# Patient Record
Sex: MALE | Race: WHITE | NOT HISPANIC OR LATINO | Employment: FULL TIME | URBAN - METROPOLITAN AREA
[De-identification: names, ages, dates, MRNs, and addresses within clinical notes are randomized per-mention and may not be internally consistent; named-entity substitution may affect disease eponyms.]

---

## 2021-01-26 ENCOUNTER — OFFICE VISIT (OUTPATIENT)
Dept: INTERNAL MEDICINE | Facility: CLINIC | Age: 36
End: 2021-01-26
Payer: COMMERCIAL

## 2021-01-26 VITALS
SYSTOLIC BLOOD PRESSURE: 130 MMHG | HEART RATE: 89 BPM | DIASTOLIC BLOOD PRESSURE: 88 MMHG | BODY MASS INDEX: 25.45 KG/M2 | RESPIRATION RATE: 18 BRPM | TEMPERATURE: 98 F | OXYGEN SATURATION: 98 % | WEIGHT: 152.75 LBS | HEIGHT: 65 IN

## 2021-01-26 DIAGNOSIS — F41.9 ANXIETY: ICD-10-CM

## 2021-01-26 DIAGNOSIS — R11.10 POST-TUSSIVE EMESIS: ICD-10-CM

## 2021-01-26 DIAGNOSIS — Z76.89 ESTABLISHING CARE WITH NEW DOCTOR, ENCOUNTER FOR: ICD-10-CM

## 2021-01-26 DIAGNOSIS — J45.909 ASTHMA, UNSPECIFIED ASTHMA SEVERITY, UNSPECIFIED WHETHER COMPLICATED, UNSPECIFIED WHETHER PERSISTENT: ICD-10-CM

## 2021-01-26 DIAGNOSIS — Z00.00 PHYSICAL EXAM, ANNUAL: Primary | ICD-10-CM

## 2021-01-26 PROCEDURE — 99999 PR PBB SHADOW E&M-NEW PATIENT-LVL III: CPT | Mod: PBBFAC,,, | Performed by: STUDENT IN AN ORGANIZED HEALTH CARE EDUCATION/TRAINING PROGRAM

## 2021-01-26 PROCEDURE — 99385 PREV VISIT NEW AGE 18-39: CPT | Mod: S$GLB,,, | Performed by: STUDENT IN AN ORGANIZED HEALTH CARE EDUCATION/TRAINING PROGRAM

## 2021-01-26 PROCEDURE — 99999 PR PBB SHADOW E&M-NEW PATIENT-LVL III: ICD-10-PCS | Mod: PBBFAC,,, | Performed by: STUDENT IN AN ORGANIZED HEALTH CARE EDUCATION/TRAINING PROGRAM

## 2021-01-26 PROCEDURE — 99385 PR PREVENTIVE VISIT,NEW,18-39: ICD-10-PCS | Mod: S$GLB,,, | Performed by: STUDENT IN AN ORGANIZED HEALTH CARE EDUCATION/TRAINING PROGRAM

## 2021-01-26 PROCEDURE — 1126F AMNT PAIN NOTED NONE PRSNT: CPT | Mod: S$GLB,,, | Performed by: STUDENT IN AN ORGANIZED HEALTH CARE EDUCATION/TRAINING PROGRAM

## 2021-01-26 PROCEDURE — 3008F PR BODY MASS INDEX (BMI) DOCUMENTED: ICD-10-PCS | Mod: CPTII,S$GLB,, | Performed by: STUDENT IN AN ORGANIZED HEALTH CARE EDUCATION/TRAINING PROGRAM

## 2021-01-26 PROCEDURE — 3008F BODY MASS INDEX DOCD: CPT | Mod: CPTII,S$GLB,, | Performed by: STUDENT IN AN ORGANIZED HEALTH CARE EDUCATION/TRAINING PROGRAM

## 2021-01-26 PROCEDURE — 1126F PR PAIN SEVERITY QUANTIFIED, NO PAIN PRESENT: ICD-10-PCS | Mod: S$GLB,,, | Performed by: STUDENT IN AN ORGANIZED HEALTH CARE EDUCATION/TRAINING PROGRAM

## 2021-01-26 RX ORDER — FLUOXETINE HYDROCHLORIDE 20 MG/1
20 CAPSULE ORAL DAILY
Qty: 30 CAPSULE | Refills: 11 | Status: SHIPPED | OUTPATIENT
Start: 2021-01-26 | End: 2022-09-01

## 2021-01-26 RX ORDER — FLUTICASONE PROPIONATE 50 MCG
1 SPRAY, SUSPENSION (ML) NASAL DAILY
Qty: 16 G | Refills: 6 | Status: SHIPPED | OUTPATIENT
Start: 2021-01-26 | End: 2022-03-11 | Stop reason: SDUPTHER

## 2021-01-26 RX ORDER — FLUTICASONE PROPIONATE AND SALMETEROL 250; 50 UG/1; UG/1
POWDER RESPIRATORY (INHALATION)
COMMUNITY
Start: 2020-01-01 | End: 2021-01-26 | Stop reason: SDUPTHER

## 2021-01-26 RX ORDER — FLUTICASONE PROPIONATE AND SALMETEROL 250; 50 UG/1; UG/1
1 POWDER RESPIRATORY (INHALATION) 2 TIMES DAILY
Qty: 1 EACH | Refills: 5 | Status: SHIPPED | OUTPATIENT
Start: 2021-01-26 | End: 2024-02-29 | Stop reason: SDUPTHER

## 2021-01-26 RX ORDER — HYDROXYZINE HYDROCHLORIDE 25 MG/ML
INJECTION, SOLUTION INTRAMUSCULAR
COMMUNITY
Start: 2020-01-01 | End: 2022-09-01

## 2021-01-26 RX ORDER — PANTOPRAZOLE SODIUM 40 MG/1
40 TABLET, DELAYED RELEASE ORAL DAILY
Qty: 30 TABLET | Refills: 11 | Status: SHIPPED | OUTPATIENT
Start: 2021-01-26 | End: 2022-09-01

## 2021-01-26 RX ORDER — MONTELUKAST SODIUM 10 MG/1
10 TABLET ORAL NIGHTLY
Qty: 90 TABLET | Refills: 5 | Status: SHIPPED | OUTPATIENT
Start: 2021-01-26 | End: 2021-06-11 | Stop reason: SDUPTHER

## 2021-01-26 RX ORDER — MONTELUKAST SODIUM 10 MG/1
TABLET ORAL
COMMUNITY
Start: 2020-01-01 | End: 2021-01-26 | Stop reason: SDUPTHER

## 2021-01-26 RX ORDER — HYDROXYZINE PAMOATE 25 MG/1
25 CAPSULE ORAL EVERY 8 HOURS PRN
Qty: 90 CAPSULE | Refills: 4 | Status: SHIPPED | OUTPATIENT
Start: 2021-01-26 | End: 2021-10-14

## 2021-01-26 RX ORDER — ALBUTEROL SULFATE 90 UG/1
2 AEROSOL, METERED RESPIRATORY (INHALATION) EVERY 4 HOURS PRN
Qty: 18 G | Refills: 5 | Status: SHIPPED | OUTPATIENT
Start: 2021-01-26

## 2021-01-30 ENCOUNTER — LAB VISIT (OUTPATIENT)
Dept: LAB | Facility: HOSPITAL | Age: 36
End: 2021-01-30
Attending: STUDENT IN AN ORGANIZED HEALTH CARE EDUCATION/TRAINING PROGRAM
Payer: COMMERCIAL

## 2021-01-30 DIAGNOSIS — Z00.00 PHYSICAL EXAM, ANNUAL: ICD-10-CM

## 2021-01-30 LAB
ALBUMIN SERPL BCP-MCNC: 4.6 G/DL (ref 3.5–5.2)
ALP SERPL-CCNC: 50 U/L (ref 55–135)
ALT SERPL W/O P-5'-P-CCNC: 19 U/L (ref 10–44)
ANION GAP SERPL CALC-SCNC: 7 MMOL/L (ref 8–16)
AST SERPL-CCNC: 16 U/L (ref 10–40)
BASOPHILS # BLD AUTO: 0.04 K/UL (ref 0–0.2)
BASOPHILS NFR BLD: 1 % (ref 0–1.9)
BILIRUB SERPL-MCNC: 0.6 MG/DL (ref 0.1–1)
BUN SERPL-MCNC: 6 MG/DL (ref 6–20)
CALCIUM SERPL-MCNC: 10.2 MG/DL (ref 8.7–10.5)
CHLORIDE SERPL-SCNC: 104 MMOL/L (ref 95–110)
CHOLEST SERPL-MCNC: 198 MG/DL (ref 120–199)
CHOLEST/HDLC SERPL: 3.4 {RATIO} (ref 2–5)
CO2 SERPL-SCNC: 30 MMOL/L (ref 23–29)
CREAT SERPL-MCNC: 0.9 MG/DL (ref 0.5–1.4)
DIFFERENTIAL METHOD: NORMAL
EOSINOPHIL # BLD AUTO: 0.1 K/UL (ref 0–0.5)
EOSINOPHIL NFR BLD: 2.7 % (ref 0–8)
ERYTHROCYTE [DISTWIDTH] IN BLOOD BY AUTOMATED COUNT: 12.2 % (ref 11.5–14.5)
EST. GFR  (AFRICAN AMERICAN): >60 ML/MIN/1.73 M^2
EST. GFR  (NON AFRICAN AMERICAN): >60 ML/MIN/1.73 M^2
ESTIMATED AVG GLUCOSE: 97 MG/DL (ref 68–131)
GLUCOSE SERPL-MCNC: 85 MG/DL (ref 70–110)
HBA1C MFR BLD: 5 % (ref 4–5.6)
HCT VFR BLD AUTO: 47.2 % (ref 40–54)
HDLC SERPL-MCNC: 58 MG/DL (ref 40–75)
HDLC SERPL: 29.3 % (ref 20–50)
HGB BLD-MCNC: 15.1 G/DL (ref 14–18)
IMM GRANULOCYTES # BLD AUTO: 0.01 K/UL (ref 0–0.04)
IMM GRANULOCYTES NFR BLD AUTO: 0.2 % (ref 0–0.5)
LDLC SERPL CALC-MCNC: 122.6 MG/DL (ref 63–159)
LYMPHOCYTES # BLD AUTO: 1 K/UL (ref 1–4.8)
LYMPHOCYTES NFR BLD: 24.9 % (ref 18–48)
MCH RBC QN AUTO: 29.2 PG (ref 27–31)
MCHC RBC AUTO-ENTMCNC: 32 G/DL (ref 32–36)
MCV RBC AUTO: 91 FL (ref 82–98)
MONOCYTES # BLD AUTO: 0.4 K/UL (ref 0.3–1)
MONOCYTES NFR BLD: 9.4 % (ref 4–15)
NEUTROPHILS # BLD AUTO: 2.6 K/UL (ref 1.8–7.7)
NEUTROPHILS NFR BLD: 61.8 % (ref 38–73)
NONHDLC SERPL-MCNC: 140 MG/DL
NRBC BLD-RTO: 0 /100 WBC
PLATELET # BLD AUTO: 217 K/UL (ref 150–350)
PMV BLD AUTO: 11.2 FL (ref 9.2–12.9)
POTASSIUM SERPL-SCNC: 4.5 MMOL/L (ref 3.5–5.1)
PROT SERPL-MCNC: 7.6 G/DL (ref 6–8.4)
RBC # BLD AUTO: 5.17 M/UL (ref 4.6–6.2)
SODIUM SERPL-SCNC: 141 MMOL/L (ref 136–145)
TRIGL SERPL-MCNC: 87 MG/DL (ref 30–150)
TSH SERPL DL<=0.005 MIU/L-ACNC: 0.7 UIU/ML (ref 0.4–4)
WBC # BLD AUTO: 4.13 K/UL (ref 3.9–12.7)

## 2021-01-30 PROCEDURE — 80053 COMPREHEN METABOLIC PANEL: CPT

## 2021-01-30 PROCEDURE — 80074 ACUTE HEPATITIS PANEL: CPT

## 2021-01-30 PROCEDURE — 86703 HIV-1/HIV-2 1 RESULT ANTBDY: CPT

## 2021-01-30 PROCEDURE — 83036 HEMOGLOBIN GLYCOSYLATED A1C: CPT

## 2021-01-30 PROCEDURE — 80061 LIPID PANEL: CPT

## 2021-01-30 PROCEDURE — 84443 ASSAY THYROID STIM HORMONE: CPT

## 2021-01-30 PROCEDURE — 36415 COLL VENOUS BLD VENIPUNCTURE: CPT | Mod: PO

## 2021-01-30 PROCEDURE — 85025 COMPLETE CBC W/AUTO DIFF WBC: CPT

## 2021-02-01 LAB
HAV IGM SERPL QL IA: NEGATIVE
HBV CORE IGM SERPL QL IA: NEGATIVE
HBV SURFACE AG SERPL QL IA: NEGATIVE
HCV AB SERPL QL IA: NEGATIVE
HIV 1+2 AB+HIV1 P24 AG SERPL QL IA: NEGATIVE

## 2021-03-29 ENCOUNTER — IMMUNIZATION (OUTPATIENT)
Dept: PRIMARY CARE CLINIC | Facility: CLINIC | Age: 36
End: 2021-03-29
Payer: COMMERCIAL

## 2021-03-29 DIAGNOSIS — Z23 NEED FOR VACCINATION: Primary | ICD-10-CM

## 2021-03-29 PROCEDURE — 0011A PR IMMUNIZ ADMIN, SARS-COV-2 COVID-19 VACC, 100MCG/0.5ML, 1ST DOSE: ICD-10-PCS | Mod: CV19,S$GLB,, | Performed by: INTERNAL MEDICINE

## 2021-03-29 PROCEDURE — 0011A PR IMMUNIZ ADMIN, SARS-COV-2 COVID-19 VACC, 100MCG/0.5ML, 1ST DOSE: CPT | Mod: CV19,S$GLB,, | Performed by: INTERNAL MEDICINE

## 2021-03-29 PROCEDURE — 91301 PR SARS-COV-2 COVID-19 VACCINE, NO PRSV, 100MCG/0.5ML, IM: CPT | Mod: S$GLB,,, | Performed by: INTERNAL MEDICINE

## 2021-03-29 PROCEDURE — 91301 PR SARS-COV-2 COVID-19 VACCINE, NO PRSV, 100MCG/0.5ML, IM: ICD-10-PCS | Mod: S$GLB,,, | Performed by: INTERNAL MEDICINE

## 2021-03-29 RX ADMIN — Medication 0.5 ML: at 02:03

## 2021-04-28 ENCOUNTER — IMMUNIZATION (OUTPATIENT)
Dept: PRIMARY CARE CLINIC | Facility: CLINIC | Age: 36
End: 2021-04-28
Payer: COMMERCIAL

## 2021-04-28 DIAGNOSIS — Z23 NEED FOR VACCINATION: Primary | ICD-10-CM

## 2021-04-28 PROCEDURE — 0012A PR IMMUNIZ ADMIN, SARS-COV-2 COVID-19 VACC, 100MCG/0.5ML, 2ND DOSE: ICD-10-PCS | Mod: CV19,S$GLB,, | Performed by: INTERNAL MEDICINE

## 2021-04-28 PROCEDURE — 91301 PR SARS-COV-2 COVID-19 VACCINE, NO PRSV, 100MCG/0.5ML, IM: ICD-10-PCS | Mod: S$GLB,,, | Performed by: INTERNAL MEDICINE

## 2021-04-28 PROCEDURE — 91301 PR SARS-COV-2 COVID-19 VACCINE, NO PRSV, 100MCG/0.5ML, IM: CPT | Mod: S$GLB,,, | Performed by: INTERNAL MEDICINE

## 2021-04-28 PROCEDURE — 0012A PR IMMUNIZ ADMIN, SARS-COV-2 COVID-19 VACC, 100MCG/0.5ML, 2ND DOSE: CPT | Mod: CV19,S$GLB,, | Performed by: INTERNAL MEDICINE

## 2021-04-28 RX ADMIN — Medication 0.5 ML: at 03:04

## 2021-05-13 ENCOUNTER — PATIENT MESSAGE (OUTPATIENT)
Dept: INTERNAL MEDICINE | Facility: CLINIC | Age: 36
End: 2021-05-13

## 2021-05-25 ENCOUNTER — PATIENT MESSAGE (OUTPATIENT)
Dept: INTERNAL MEDICINE | Facility: CLINIC | Age: 36
End: 2021-05-25

## 2021-06-11 ENCOUNTER — OFFICE VISIT (OUTPATIENT)
Dept: INTERNAL MEDICINE | Facility: CLINIC | Age: 36
End: 2021-06-11
Payer: COMMERCIAL

## 2021-06-11 VITALS
TEMPERATURE: 97 F | HEIGHT: 65 IN | BODY MASS INDEX: 27.1 KG/M2 | OXYGEN SATURATION: 99 % | WEIGHT: 162.69 LBS | HEART RATE: 79 BPM | SYSTOLIC BLOOD PRESSURE: 106 MMHG | DIASTOLIC BLOOD PRESSURE: 82 MMHG | RESPIRATION RATE: 18 BRPM

## 2021-06-11 DIAGNOSIS — F41.9 ANXIETY: ICD-10-CM

## 2021-06-11 DIAGNOSIS — G47.00 INSOMNIA, UNSPECIFIED TYPE: ICD-10-CM

## 2021-06-11 DIAGNOSIS — F32.A DEPRESSION, UNSPECIFIED DEPRESSION TYPE: ICD-10-CM

## 2021-06-11 DIAGNOSIS — R41.840 CONCENTRATION DEFICIT: Primary | ICD-10-CM

## 2021-06-11 PROCEDURE — 99214 OFFICE O/P EST MOD 30 MIN: CPT | Mod: S$GLB,,, | Performed by: STUDENT IN AN ORGANIZED HEALTH CARE EDUCATION/TRAINING PROGRAM

## 2021-06-11 PROCEDURE — 99214 PR OFFICE/OUTPT VISIT, EST, LEVL IV, 30-39 MIN: ICD-10-PCS | Mod: S$GLB,,, | Performed by: STUDENT IN AN ORGANIZED HEALTH CARE EDUCATION/TRAINING PROGRAM

## 2021-06-11 PROCEDURE — 3008F PR BODY MASS INDEX (BMI) DOCUMENTED: ICD-10-PCS | Mod: CPTII,S$GLB,, | Performed by: STUDENT IN AN ORGANIZED HEALTH CARE EDUCATION/TRAINING PROGRAM

## 2021-06-11 PROCEDURE — 1126F PR PAIN SEVERITY QUANTIFIED, NO PAIN PRESENT: ICD-10-PCS | Mod: S$GLB,,, | Performed by: STUDENT IN AN ORGANIZED HEALTH CARE EDUCATION/TRAINING PROGRAM

## 2021-06-11 PROCEDURE — 3008F BODY MASS INDEX DOCD: CPT | Mod: CPTII,S$GLB,, | Performed by: STUDENT IN AN ORGANIZED HEALTH CARE EDUCATION/TRAINING PROGRAM

## 2021-06-11 PROCEDURE — 1126F AMNT PAIN NOTED NONE PRSNT: CPT | Mod: S$GLB,,, | Performed by: STUDENT IN AN ORGANIZED HEALTH CARE EDUCATION/TRAINING PROGRAM

## 2021-06-11 PROCEDURE — 99999 PR PBB SHADOW E&M-EST. PATIENT-LVL IV: CPT | Mod: PBBFAC,,, | Performed by: STUDENT IN AN ORGANIZED HEALTH CARE EDUCATION/TRAINING PROGRAM

## 2021-06-11 PROCEDURE — 99999 PR PBB SHADOW E&M-EST. PATIENT-LVL IV: ICD-10-PCS | Mod: PBBFAC,,, | Performed by: STUDENT IN AN ORGANIZED HEALTH CARE EDUCATION/TRAINING PROGRAM

## 2021-06-11 RX ORDER — MONTELUKAST SODIUM 10 MG/1
10 TABLET ORAL NIGHTLY
Qty: 90 TABLET | Refills: 5 | Status: SHIPPED | OUTPATIENT
Start: 2021-06-11 | End: 2022-07-07

## 2021-06-11 RX ORDER — BUPROPION HYDROCHLORIDE 150 MG/1
150 TABLET, EXTENDED RELEASE ORAL 2 TIMES DAILY
Qty: 60 TABLET | Refills: 11 | Status: SHIPPED | OUTPATIENT
Start: 2021-06-11 | End: 2022-01-07 | Stop reason: DRUGHIGH

## 2021-11-11 ENCOUNTER — IMMUNIZATION (OUTPATIENT)
Dept: INTERNAL MEDICINE | Facility: CLINIC | Age: 36
End: 2021-11-11
Payer: COMMERCIAL

## 2021-11-11 DIAGNOSIS — Z23 NEED FOR VACCINATION: Primary | ICD-10-CM

## 2021-11-11 PROCEDURE — 0064A COVID-19, MRNA, LNP-S, PF, 100 MCG/0.25 ML DOSE VACCINE (MODERNA BOOSTER): CPT | Mod: PBBFAC | Performed by: INTERNAL MEDICINE

## 2022-01-07 ENCOUNTER — OFFICE VISIT (OUTPATIENT)
Dept: INTERNAL MEDICINE | Facility: CLINIC | Age: 37
End: 2022-01-07
Payer: COMMERCIAL

## 2022-01-07 DIAGNOSIS — F41.1 GENERALIZED ANXIETY DISORDER WITH PANIC ATTACKS: ICD-10-CM

## 2022-01-07 DIAGNOSIS — F33.1 MODERATE EPISODE OF RECURRENT MAJOR DEPRESSIVE DISORDER: Primary | ICD-10-CM

## 2022-01-07 DIAGNOSIS — F41.0 GENERALIZED ANXIETY DISORDER WITH PANIC ATTACKS: ICD-10-CM

## 2022-01-07 PROCEDURE — 99214 OFFICE O/P EST MOD 30 MIN: CPT | Mod: 95,,, | Performed by: STUDENT IN AN ORGANIZED HEALTH CARE EDUCATION/TRAINING PROGRAM

## 2022-01-07 PROCEDURE — 99214 PR OFFICE/OUTPT VISIT, EST, LEVL IV, 30-39 MIN: ICD-10-PCS | Mod: 95,,, | Performed by: STUDENT IN AN ORGANIZED HEALTH CARE EDUCATION/TRAINING PROGRAM

## 2022-01-07 RX ORDER — BUPROPION HYDROCHLORIDE 150 MG/1
150 TABLET ORAL DAILY
Qty: 30 TABLET | Refills: 11 | Status: SHIPPED | OUTPATIENT
Start: 2022-01-07 | End: 2023-01-24 | Stop reason: SDUPTHER

## 2022-01-07 RX ORDER — ALPRAZOLAM 0.25 MG/1
0.25 TABLET ORAL 2 TIMES DAILY PRN
Qty: 90 TABLET | Refills: 2 | Status: SHIPPED | OUTPATIENT
Start: 2022-01-07 | End: 2022-09-01

## 2022-03-02 RX ORDER — HYDROXYZINE PAMOATE 25 MG/1
25 CAPSULE ORAL EVERY 8 HOURS PRN
Qty: 90 CAPSULE | Refills: 4 | Status: SHIPPED | OUTPATIENT
Start: 2022-03-02 | End: 2022-09-01

## 2022-03-11 RX ORDER — FLUTICASONE PROPIONATE 50 MCG
1 SPRAY, SUSPENSION (ML) NASAL DAILY
Qty: 16 G | Refills: 6 | Status: SHIPPED | OUTPATIENT
Start: 2022-03-11 | End: 2022-04-04

## 2022-03-11 NOTE — TELEPHONE ENCOUNTER
No new care gaps identified.  Powered by [employer]. Reference number: [id number].   3/11/2022 1:57:41 PM CST

## 2022-04-04 RX ORDER — HYDROXYZINE PAMOATE 25 MG/1
25 CAPSULE ORAL EVERY 8 HOURS PRN
Qty: 270 CAPSULE | Refills: 2 | OUTPATIENT
Start: 2022-04-04

## 2022-04-04 NOTE — TELEPHONE ENCOUNTER
Pt requested Rx for Vistaril, there are refills available at his pharmacy.  Pt was sent a [employer] message to contact his pharmacy for his Rx request

## 2022-07-07 RX ORDER — MONTELUKAST SODIUM 10 MG/1
TABLET ORAL
Qty: 90 TABLET | Refills: 1 | Status: SHIPPED | OUTPATIENT
Start: 2022-07-07

## 2022-07-07 NOTE — TELEPHONE ENCOUNTER
No new care gaps identified.  [facility] Embedded Care Gaps. Reference number: [id number]. 7/07/2022   4:07:09 AM REBECCAT

## 2022-07-07 NOTE — TELEPHONE ENCOUNTER
Refill Authorization Note   Amol Oliveiraparth  is requesting a refill authorization.  Brief Assessment and Rationale for Refill:  Approve     Medication Therapy Plan:       Medication Reconciliation Completed: No   Comments:     No Care Gaps Recommended  Note composed:10:33 AM 07/07/2022

## 2022-07-11 ENCOUNTER — LAB VISIT (OUTPATIENT)
Dept: LAB | Facility: HOSPITAL | Age: 37
End: 2022-07-11
Attending: STUDENT IN AN ORGANIZED HEALTH CARE EDUCATION/TRAINING PROGRAM
Payer: COMMERCIAL

## 2022-07-11 ENCOUNTER — OFFICE VISIT (OUTPATIENT)
Dept: INTERNAL MEDICINE | Facility: CLINIC | Age: 37
End: 2022-07-11
Payer: COMMERCIAL

## 2022-07-11 VITALS
RESPIRATION RATE: 15 BRPM | SYSTOLIC BLOOD PRESSURE: 128 MMHG | DIASTOLIC BLOOD PRESSURE: 80 MMHG | WEIGHT: 187.38 LBS | BODY MASS INDEX: 31.22 KG/M2 | OXYGEN SATURATION: 98 % | HEIGHT: 65 IN | TEMPERATURE: 97 F | HEART RATE: 84 BPM

## 2022-07-11 DIAGNOSIS — F41.1 GENERALIZED ANXIETY DISORDER WITH PANIC ATTACKS: ICD-10-CM

## 2022-07-11 DIAGNOSIS — F41.0 GENERALIZED ANXIETY DISORDER WITH PANIC ATTACKS: ICD-10-CM

## 2022-07-11 DIAGNOSIS — Z00.00 PHYSICAL EXAM, ANNUAL: Primary | ICD-10-CM

## 2022-07-11 DIAGNOSIS — Z00.00 PHYSICAL EXAM, ANNUAL: ICD-10-CM

## 2022-07-11 DIAGNOSIS — Z30.09 VASECTOMY EVALUATION: ICD-10-CM

## 2022-07-11 PROCEDURE — 1159F MED LIST DOCD IN RCRD: CPT | Mod: CPTII,S$GLB,, | Performed by: STUDENT IN AN ORGANIZED HEALTH CARE EDUCATION/TRAINING PROGRAM

## 2022-07-11 PROCEDURE — 84443 ASSAY THYROID STIM HORMONE: CPT | Performed by: STUDENT IN AN ORGANIZED HEALTH CARE EDUCATION/TRAINING PROGRAM

## 2022-07-11 PROCEDURE — 3079F DIAST BP 80-89 MM HG: CPT | Mod: CPTII,S$GLB,, | Performed by: STUDENT IN AN ORGANIZED HEALTH CARE EDUCATION/TRAINING PROGRAM

## 2022-07-11 PROCEDURE — 3008F PR BODY MASS INDEX (BMI) DOCUMENTED: ICD-10-PCS | Mod: CPTII,S$GLB,, | Performed by: STUDENT IN AN ORGANIZED HEALTH CARE EDUCATION/TRAINING PROGRAM

## 2022-07-11 PROCEDURE — 85025 COMPLETE CBC W/AUTO DIFF WBC: CPT | Performed by: STUDENT IN AN ORGANIZED HEALTH CARE EDUCATION/TRAINING PROGRAM

## 2022-07-11 PROCEDURE — 36415 COLL VENOUS BLD VENIPUNCTURE: CPT | Mod: PO | Performed by: STUDENT IN AN ORGANIZED HEALTH CARE EDUCATION/TRAINING PROGRAM

## 2022-07-11 PROCEDURE — 99999 PR PBB SHADOW E&M-EST. PATIENT-LVL V: CPT | Mod: PBBFAC,,, | Performed by: STUDENT IN AN ORGANIZED HEALTH CARE EDUCATION/TRAINING PROGRAM

## 2022-07-11 PROCEDURE — 82306 VITAMIN D 25 HYDROXY: CPT | Performed by: STUDENT IN AN ORGANIZED HEALTH CARE EDUCATION/TRAINING PROGRAM

## 2022-07-11 PROCEDURE — 3074F SYST BP LT 130 MM HG: CPT | Mod: CPTII,S$GLB,, | Performed by: STUDENT IN AN ORGANIZED HEALTH CARE EDUCATION/TRAINING PROGRAM

## 2022-07-11 PROCEDURE — 3074F PR MOST RECENT SYSTOLIC BLOOD PRESSURE < 130 MM HG: ICD-10-PCS | Mod: CPTII,S$GLB,, | Performed by: STUDENT IN AN ORGANIZED HEALTH CARE EDUCATION/TRAINING PROGRAM

## 2022-07-11 PROCEDURE — 99395 PREV VISIT EST AGE 18-39: CPT | Mod: S$GLB,,, | Performed by: STUDENT IN AN ORGANIZED HEALTH CARE EDUCATION/TRAINING PROGRAM

## 2022-07-11 PROCEDURE — 1159F PR MEDICATION LIST DOCUMENTED IN MEDICAL RECORD: ICD-10-PCS | Mod: CPTII,S$GLB,, | Performed by: STUDENT IN AN ORGANIZED HEALTH CARE EDUCATION/TRAINING PROGRAM

## 2022-07-11 PROCEDURE — 84436 ASSAY OF TOTAL THYROXINE: CPT | Performed by: STUDENT IN AN ORGANIZED HEALTH CARE EDUCATION/TRAINING PROGRAM

## 2022-07-11 PROCEDURE — 99999 PR PBB SHADOW E&M-EST. PATIENT-LVL V: ICD-10-PCS | Mod: PBBFAC,,, | Performed by: STUDENT IN AN ORGANIZED HEALTH CARE EDUCATION/TRAINING PROGRAM

## 2022-07-11 PROCEDURE — 99395 PR PREVENTIVE VISIT,EST,18-39: ICD-10-PCS | Mod: S$GLB,,, | Performed by: STUDENT IN AN ORGANIZED HEALTH CARE EDUCATION/TRAINING PROGRAM

## 2022-07-11 PROCEDURE — 3008F BODY MASS INDEX DOCD: CPT | Mod: CPTII,S$GLB,, | Performed by: STUDENT IN AN ORGANIZED HEALTH CARE EDUCATION/TRAINING PROGRAM

## 2022-07-11 PROCEDURE — 3079F PR MOST RECENT DIASTOLIC BLOOD PRESSURE 80-89 MM HG: ICD-10-PCS | Mod: CPTII,S$GLB,, | Performed by: STUDENT IN AN ORGANIZED HEALTH CARE EDUCATION/TRAINING PROGRAM

## 2022-07-11 PROCEDURE — 80061 LIPID PANEL: CPT | Performed by: STUDENT IN AN ORGANIZED HEALTH CARE EDUCATION/TRAINING PROGRAM

## 2022-07-11 PROCEDURE — 80053 COMPREHEN METABOLIC PANEL: CPT | Performed by: STUDENT IN AN ORGANIZED HEALTH CARE EDUCATION/TRAINING PROGRAM

## 2022-07-11 NOTE — PROGRESS NOTES
Subjective:      Chief Complaint: Annual Exam    HPI   Mr. Amol North is a 37-year-old man presenting for annual physical and ongoing evaluation/treatment for combination depression anxiety:    Depression/anxiety:  -Wellbutrin uptitrated last appointment with concomitant prescribing of p.r.n. low-dose abortive Xanax (to account for any interval worsening in panic attack severity/frequency; does not appear to have ever been filled)  - doing very well on combination Wellbutrin and cognitive behavioral therapy without need for p.r.n. Xanax use.    Vasectomy referral:  - the patient and his wife have extensively discussed neither is interested in having children  - he understands that vasectomies, although reversible, are considered definitive stabilization    Family, social, surgical Hx reviewed     Health Maintenance:  Due for annual screening labs    Past Medical History:   Diagnosis Date    Anxiety     Asthma     Depression     IBS (irritable bowel syndrome)      No past surgical history on file.  Family History   Problem Relation Age of Onset    Anxiety disorder Mother     Anxiety disorder Father      Social History     Socioeconomic History    Marital status: Significant Other   Occupational History    Occupation: PHD student    Tobacco Use    Smoking status: Never Smoker    Smokeless tobacco: Never Used   Substance and Sexual Activity    Alcohol use: Yes     Comment: occ;     Drug use: Yes     Types: Marijuana     Comment: none currently     Sexual activity: Yes     Partners: Female     Review of patient's allergies indicates:   Allergen Reactions    Biaxin [clarithromycin] Palpitations     Amol North had no medications administered during this visit.      Review of Systems   Constitutional: Negative for activity change and unexpected weight change.   HENT: Negative for hearing loss, rhinorrhea and trouble swallowing.    Eyes: Negative for discharge and visual disturbance.    Respiratory: Negative for chest tightness and wheezing.    Cardiovascular: Negative for chest pain and palpitations.   Gastrointestinal: Negative for blood in stool, constipation, diarrhea and vomiting.   Endocrine: Negative for polydipsia and polyuria.   Genitourinary: Negative for difficulty urinating, hematuria and urgency.   Musculoskeletal: Negative for arthralgias, joint swelling and neck pain.   Neurological: Negative for weakness and headaches.   Psychiatric/Behavioral: Negative for confusion and dysphoric mood.         Objective:      Vitals:    07/11/22 1508   BP: 128/80   Pulse: 84   Resp: 15   Temp: 97.3 °F (36.3 °C)      Physical Exam  Vitals reviewed.   Constitutional:       General: He is not in acute distress.     Appearance: Normal appearance.   HENT:      Head: Normocephalic and atraumatic.      Comments: Facial features are symmetric      Nose: Nose normal. No congestion or rhinorrhea.      Mouth/Throat:      Mouth: Mucous membranes are moist.      Pharynx: Oropharynx is clear. No oropharyngeal exudate or posterior oropharyngeal erythema.   Eyes:      General: No scleral icterus.     Extraocular Movements: Extraocular movements intact.      Conjunctiva/sclera: Conjunctivae normal.   Cardiovascular:      Rate and Rhythm: Normal rate and regular rhythm.      Pulses: Normal pulses.      Heart sounds: Normal heart sounds.   Pulmonary:      Effort: Pulmonary effort is normal. No respiratory distress.      Breath sounds: Normal breath sounds.   Musculoskeletal:         General: No deformity or signs of injury. Normal range of motion.      Cervical back: Normal range of motion.      Comments: Gait normal    Skin:     General: Skin is warm and dry.      Findings: No rash.   Neurological:      General: No focal deficit present.      Mental Status: He is alert and oriented to person, place, and time. Mental status is at baseline.   Psychiatric:         Mood and Affect: Mood normal.         Behavior:  Behavior normal.         Thought Content: Thought content normal.       Current Outpatient Medications on File Prior to Visit   Medication Sig Dispense Refill    albuterol (VENTOLIN HFA) 90 mcg/actuation inhaler Inhale 2 puffs into the lungs every 4 (four) hours as needed for Wheezing. Rescue 18 g 5    ALBUTEROL, BULK, MISC       buPROPion (WELLBUTRIN XL) 150 MG TB24 tablet Take 1 tablet (150 mg total) by mouth once daily. 30 tablet 11    fluticasone propionate (FLONASE) 50 mcg/actuation nasal spray USE 1 SPRAY (50 MCG TOTAL) IN EACH NOSTRIL ONCE DAILY 48 mL 3    fluticasone-salmeterol diskus inhaler 250-50 mcg Inhale 1 puff into the lungs 2 (two) times a day. 1 each 5    montelukast (SINGULAIR) 10 mg tablet TAKE 1 TABLET BY MOUTH EVERY DAY IN THE EVENING 90 tablet 1    ALPRAZolam (XANAX) 0.25 MG tablet Take 1 tablet (0.25 mg total) by mouth 2 (two) times daily as needed for Insomnia or Anxiety. 90 tablet 2    FLUoxetine 20 MG capsule Take 1 capsule (20 mg total) by mouth once daily. (Patient not taking: Reported on 6/11/2021) 30 capsule 11    hydrOXYzine (VISTARIL) 25 mg/mL injection       hydrOXYzine pamoate (VISTARIL) 25 MG Cap TAKE 1 CAPSULE (25 MG TOTAL) BY MOUTH EVERY 8 (EIGHT) HOURS AS NEEDED. 90 capsule 4    pantoprazole (PROTONIX) 40 MG tablet Take 1 tablet (40 mg total) by mouth once daily. (Patient not taking: Reported on 6/11/2021) 30 tablet 11     No current facility-administered medications on file prior to visit.         Assessment:       1. Physical exam, annual    2. Vasectomy evaluation    3. Generalized anxiety disorder with panic attacks        Plan:       Physical exam, annual  -     CBC Auto Differential; Future; Expected date: 07/11/2022  -     Comprehensive Metabolic Panel; Future; Expected date: 07/11/2022  -     Lipid Panel; Future; Expected date: 07/11/2022  -     TSH; Future; Expected date: 07/11/2022  -     T4; Future; Expected date: 07/11/2022  -     Vitamin D; Future;  Expected date: 07/11/2022   - annual screening labs ordered    Vasectomy evaluation  -     Ambulatory referral/consult to Urology; Future; Expected date: 07/18/2022    Generalized anxiety disorder with panic attacks   - doing very well; continue current regimen and return to clinic p.r.n.

## 2022-07-12 LAB
25(OH)D3+25(OH)D2 SERPL-MCNC: 32 NG/ML (ref 30–96)
ALBUMIN SERPL BCP-MCNC: 4.6 G/DL (ref 3.5–5.2)
ALP SERPL-CCNC: 60 U/L (ref 55–135)
ALT SERPL W/O P-5'-P-CCNC: 50 U/L (ref 10–44)
ANION GAP SERPL CALC-SCNC: 10 MMOL/L (ref 8–16)
AST SERPL-CCNC: 32 U/L (ref 10–40)
BASOPHILS # BLD AUTO: 0.03 K/UL (ref 0–0.2)
BASOPHILS NFR BLD: 0.6 % (ref 0–1.9)
BILIRUB SERPL-MCNC: 0.8 MG/DL (ref 0.1–1)
BUN SERPL-MCNC: 6 MG/DL (ref 6–20)
CALCIUM SERPL-MCNC: 10.5 MG/DL (ref 8.7–10.5)
CHLORIDE SERPL-SCNC: 103 MMOL/L (ref 95–110)
CHOLEST SERPL-MCNC: 254 MG/DL (ref 120–199)
CHOLEST/HDLC SERPL: 3.8 {RATIO} (ref 2–5)
CO2 SERPL-SCNC: 25 MMOL/L (ref 23–29)
CREAT SERPL-MCNC: 0.9 MG/DL (ref 0.5–1.4)
DIFFERENTIAL METHOD: NORMAL
EOSINOPHIL # BLD AUTO: 0.2 K/UL (ref 0–0.5)
EOSINOPHIL NFR BLD: 3.9 % (ref 0–8)
ERYTHROCYTE [DISTWIDTH] IN BLOOD BY AUTOMATED COUNT: 12.1 % (ref 11.5–14.5)
EST. GFR  (AFRICAN AMERICAN): >60 ML/MIN/1.73 M^2
EST. GFR  (NON AFRICAN AMERICAN): >60 ML/MIN/1.73 M^2
GLUCOSE SERPL-MCNC: 88 MG/DL (ref 70–110)
HCT VFR BLD AUTO: 46.8 % (ref 40–54)
HDLC SERPL-MCNC: 66 MG/DL (ref 40–75)
HDLC SERPL: 26 % (ref 20–50)
HGB BLD-MCNC: 15.6 G/DL (ref 14–18)
IMM GRANULOCYTES # BLD AUTO: 0.01 K/UL (ref 0–0.04)
IMM GRANULOCYTES NFR BLD AUTO: 0.2 % (ref 0–0.5)
LDLC SERPL CALC-MCNC: 162.4 MG/DL (ref 63–159)
LYMPHOCYTES # BLD AUTO: 1.3 K/UL (ref 1–4.8)
LYMPHOCYTES NFR BLD: 25.6 % (ref 18–48)
MCH RBC QN AUTO: 29.8 PG (ref 27–31)
MCHC RBC AUTO-ENTMCNC: 33.3 G/DL (ref 32–36)
MCV RBC AUTO: 90 FL (ref 82–98)
MONOCYTES # BLD AUTO: 0.5 K/UL (ref 0.3–1)
MONOCYTES NFR BLD: 9.6 % (ref 4–15)
NEUTROPHILS # BLD AUTO: 3 K/UL (ref 1.8–7.7)
NEUTROPHILS NFR BLD: 60.1 % (ref 38–73)
NONHDLC SERPL-MCNC: 188 MG/DL
NRBC BLD-RTO: 0 /100 WBC
PLATELET # BLD AUTO: 226 K/UL (ref 150–450)
PMV BLD AUTO: 11.4 FL (ref 9.2–12.9)
POTASSIUM SERPL-SCNC: 4.7 MMOL/L (ref 3.5–5.1)
PROT SERPL-MCNC: 7.9 G/DL (ref 6–8.4)
RBC # BLD AUTO: 5.23 M/UL (ref 4.6–6.2)
SODIUM SERPL-SCNC: 138 MMOL/L (ref 136–145)
T4 SERPL-MCNC: 7 UG/DL (ref 4.5–11.5)
TRIGL SERPL-MCNC: 128 MG/DL (ref 30–150)
TSH SERPL DL<=0.005 MIU/L-ACNC: 0.72 UIU/ML (ref 0.4–4)
WBC # BLD AUTO: 4.92 K/UL (ref 3.9–12.7)

## 2022-08-05 ENCOUNTER — OFFICE VISIT (OUTPATIENT)
Dept: UROLOGY | Facility: CLINIC | Age: 37
End: 2022-08-05
Payer: COMMERCIAL

## 2022-08-05 VITALS
BODY MASS INDEX: 31.44 KG/M2 | DIASTOLIC BLOOD PRESSURE: 83 MMHG | WEIGHT: 188.69 LBS | HEIGHT: 65 IN | SYSTOLIC BLOOD PRESSURE: 119 MMHG | HEART RATE: 70 BPM

## 2022-08-05 DIAGNOSIS — Z30.09 VASECTOMY EVALUATION: ICD-10-CM

## 2022-08-05 PROCEDURE — 99999 PR PBB SHADOW E&M-EST. PATIENT-LVL IV: CPT | Mod: PBBFAC,,, | Performed by: UROLOGY

## 2022-08-05 PROCEDURE — 99203 OFFICE O/P NEW LOW 30 MIN: CPT | Mod: S$GLB,,, | Performed by: UROLOGY

## 2022-08-05 PROCEDURE — 99203 PR OFFICE/OUTPT VISIT, NEW, LEVL III, 30-44 MIN: ICD-10-PCS | Mod: S$GLB,,, | Performed by: UROLOGY

## 2022-08-05 PROCEDURE — 1159F PR MEDICATION LIST DOCUMENTED IN MEDICAL RECORD: ICD-10-PCS | Mod: CPTII,S$GLB,, | Performed by: UROLOGY

## 2022-08-05 PROCEDURE — 3074F PR MOST RECENT SYSTOLIC BLOOD PRESSURE < 130 MM HG: ICD-10-PCS | Mod: CPTII,S$GLB,, | Performed by: UROLOGY

## 2022-08-05 PROCEDURE — 99999 PR PBB SHADOW E&M-EST. PATIENT-LVL IV: ICD-10-PCS | Mod: PBBFAC,,, | Performed by: UROLOGY

## 2022-08-05 PROCEDURE — 1159F MED LIST DOCD IN RCRD: CPT | Mod: CPTII,S$GLB,, | Performed by: UROLOGY

## 2022-08-05 PROCEDURE — 3008F PR BODY MASS INDEX (BMI) DOCUMENTED: ICD-10-PCS | Mod: CPTII,S$GLB,, | Performed by: UROLOGY

## 2022-08-05 PROCEDURE — 3074F SYST BP LT 130 MM HG: CPT | Mod: CPTII,S$GLB,, | Performed by: UROLOGY

## 2022-08-05 PROCEDURE — 3079F DIAST BP 80-89 MM HG: CPT | Mod: CPTII,S$GLB,, | Performed by: UROLOGY

## 2022-08-05 PROCEDURE — 3008F BODY MASS INDEX DOCD: CPT | Mod: CPTII,S$GLB,, | Performed by: UROLOGY

## 2022-08-05 PROCEDURE — 3079F PR MOST RECENT DIASTOLIC BLOOD PRESSURE 80-89 MM HG: ICD-10-PCS | Mod: CPTII,S$GLB,, | Performed by: UROLOGY

## 2022-08-05 RX ORDER — DIAZEPAM 5 MG/1
5 TABLET ORAL
Qty: 3 TABLET | Refills: 0 | Status: SHIPPED | OUTPATIENT
Start: 2022-08-05 | End: 2024-02-28

## 2022-08-05 RX ORDER — HYDROCODONE BITARTRATE AND ACETAMINOPHEN 5; 325 MG/1; MG/1
1 TABLET ORAL EVERY 6 HOURS PRN
Qty: 10 TABLET | Refills: 0 | Status: SHIPPED | OUTPATIENT
Start: 2022-08-05 | End: 2024-02-28

## 2022-08-05 RX ORDER — DOXYCYCLINE 100 MG/1
100 CAPSULE ORAL 2 TIMES DAILY
Qty: 6 CAPSULE | Refills: 0 | Status: SHIPPED | OUTPATIENT
Start: 2022-08-05 | End: 2022-08-08

## 2022-08-05 RX ORDER — LIDOCAINE HYDROCHLORIDE 20 MG/ML
10 INJECTION, SOLUTION INFILTRATION; PERINEURAL ONCE
Status: CANCELLED | OUTPATIENT
Start: 2022-08-05 | End: 2022-08-05

## 2022-08-05 NOTE — PATIENT INSTRUCTIONS
The risks and benefits of vasectomy were discussed with the patient in detail.    The risks include bleeding, infection, pain, scarring, chronic pain, sperm granuloma, recannulization and loss of testicular function.    The patient was informed that the long term effects of vasectomy are unknown and could be associated with a higher risk of prostate cancer.    He will make sure he is off all blood thinners 1 week prior.    Consent was obtained.  The patient will be scheduled for vasectomy.     A prescription was written for Valium and Doxycline pre-op and Hydrocodone post-vasectomy.  Detailed instruction on how to take valium and Doxycycline was given.     He will shave the scrotum as instructed and take an antibiotic shower prior to his vasectomy.  Also recommend that he should bring a jock strap with him for scrotal support after vasectomy.

## 2022-08-05 NOTE — PROGRESS NOTES
CC: desired vasectomy    Amol North is a 37 y.o. man who is here for the evaluation of vasectomy  A new pt referred by his PCP, Kamini Rizvi MD   He is  but has no child.  He and his wife are interested in vasectomy for sterilization.  His wife has been using IUD so far.  He works at [employer].  No family hx of prostate cancer.    Past Medical History:   Diagnosis Date    Anxiety     Asthma     Depression     IBS (irritable bowel syndrome)      History reviewed. No pertinent surgical history.  Social History     Tobacco Use    Smoking status: Never Smoker    Smokeless tobacco: Never Used   Substance Use Topics    Alcohol use: Yes     Comment: occ;     Drug use: Yes     Types: Marijuana     Comment: none currently      Family History   Problem Relation Age of Onset    Anxiety disorder Mother     Anxiety disorder Father      Allergy:  Review of patient's allergies indicates:   Allergen Reactions    Biaxin [clarithromycin] Palpitations     Outpatient Encounter Medications as of 8/5/2022   Medication Sig Dispense Refill    albuterol (VENTOLIN HFA) 90 mcg/actuation inhaler Inhale 2 puffs into the lungs every 4 (four) hours as needed for Wheezing. Rescue 18 g 5    ALBUTEROL, BULK, MISC       buPROPion (WELLBUTRIN XL) 150 MG TB24 tablet Take 1 tablet (150 mg total) by mouth once daily. 30 tablet 11    fluticasone propionate (FLONASE) 50 mcg/actuation nasal spray USE 1 SPRAY (50 MCG TOTAL) IN EACH NOSTRIL ONCE DAILY 48 mL 3    fluticasone-salmeterol diskus inhaler 250-50 mcg Inhale 1 puff into the lungs 2 (two) times a day. 1 each 5    montelukast (SINGULAIR) 10 mg tablet TAKE 1 TABLET BY MOUTH EVERY DAY IN THE EVENING 90 tablet 1    ALPRAZolam (XANAX) 0.25 MG tablet Take 1 tablet (0.25 mg total) by mouth 2 (two) times daily as needed for Insomnia or Anxiety. 90 tablet 2    diazePAM (VALIUM) 5 MG tablet Take 1 tablet (5 mg total) by mouth as needed for Anxiety.  Take all 3 pills 1 hour before the procedure 3 tablet 0    doxycycline (VIBRAMYCIN) 100 MG Cap Take 1 capsule (100 mg total) by mouth 2 (two) times daily. Take the first tablet 1 hour before the procedure, then twice a day. for 6 doses 6 capsule 0    FLUoxetine 20 MG capsule Take 1 capsule (20 mg total) by mouth once daily. (Patient not taking: Reported on 6/11/2021) 30 capsule 11    HYDROcodone-acetaminophen (NORCO) 5-325 mg per tablet Take 1 tablet by mouth every 6 (six) hours as needed for Pain. 10 tablet 0    hydrOXYzine (VISTARIL) 25 mg/mL injection       hydrOXYzine pamoate (VISTARIL) 25 MG Cap TAKE 1 CAPSULE (25 MG TOTAL) BY MOUTH EVERY 8 (EIGHT) HOURS AS NEEDED. 90 capsule 4    pantoprazole (PROTONIX) 40 MG tablet Take 1 tablet (40 mg total) by mouth once daily. (Patient not taking: Reported on 6/11/2021) 30 tablet 11     No facility-administered encounter medications on file as of 8/5/2022.     Review of Systems   ROS  Physical Exam     Vitals:    08/05/22 0934   BP: 119/83   Pulse: 70     Physical Exam  Genitalia:  Scrotum: no rash or lesion  Normal symmetric epididymis without masses  Normal vas palpated  Normal size, symmetric testicles with no masses   Normal urethral meatus with no discharge  Normal circumcised penis with no lesion         LABS:  No results found for: PSA, PSADIAG, PSATOTAL, PSAFREE, PSAFREEPCT  No results found for this or any previous visit.  Lab Results   Component Value Date    CREATININE 0.9 07/11/2022    CREATININE 0.9 01/30/2021     No results found for this or any previous visit.  No results found for: LABURIN  Hemoglobin A1C   Date Value Ref Range Status   01/30/2021 5.0 4.0 - 5.6 % Final     Comment:     ADA Screening Guidelines:  5.7-6.4%  Consistent with prediabetes  >or=6.5%  Consistent with diabetes    High levels of fetal hemoglobin interfere with the HbA1C  assay. Heterozygous hemoglobin variants (HbS, HgC, etc)do  not significantly interfere with this assay.    However, presence of multiple variants may affect accuracy.         Radiology:    Assessment and Plan:  Diagnoses and all orders for this visit:    Vasectomy evaluation  -     Ambulatory referral/consult to Urology  -     Vasectomy; Future  -     diazePAM (VALIUM) 5 MG tablet; Take 1 tablet (5 mg total) by mouth as needed for Anxiety. Take all 3 pills 1 hour before the procedure  -     doxycycline (VIBRAMYCIN) 100 MG Cap; Take 1 capsule (100 mg total) by mouth 2 (two) times daily. Take the first tablet 1 hour before the procedure, then twice a day. for 6 doses  -     HYDROcodone-acetaminophen (NORCO) 5-325 mg per tablet; Take 1 tablet by mouth every 6 (six) hours as needed for Pain.    Other orders  -     LIDOcaine HCL 20 mg/ml (2%) injection 10 mL    The risks and benefits of vasectomy were discussed with the patient in detail.    The risks include bleeding, infection, pain, scarring, chronic pain, sperm granuloma, recannulization and loss of testicular function.    The patient was informed that the long term effects of vasectomy are unknown and could be associated with a higher risk of prostate cancer.    He will make sure he is off all blood thinners 1 week prior.    Consent was obtained.  The patient will be scheduled for vasectomy.     A prescription was written for Valium and Doxycline pre-op and Hydrocodone post-vasectomy.  Detailed instruction on how to take valium and Doxycycline was given.     He will shave the scrotum as instructed and take an antibiotic shower prior to his vasectomy.  Also recommend that he should bring a jock strap with him for scrotal support after vasectomy.    Follow-up:  Follow up vasectomy.

## 2022-09-01 ENCOUNTER — PROCEDURE VISIT (OUTPATIENT)
Dept: UROLOGY | Facility: CLINIC | Age: 37
End: 2022-09-01
Payer: COMMERCIAL

## 2022-09-01 VITALS
HEART RATE: 84 BPM | WEIGHT: 187.38 LBS | HEIGHT: 65 IN | DIASTOLIC BLOOD PRESSURE: 78 MMHG | BODY MASS INDEX: 31.22 KG/M2 | RESPIRATION RATE: 17 BRPM | TEMPERATURE: 98 F | SYSTOLIC BLOOD PRESSURE: 121 MMHG

## 2022-09-01 DIAGNOSIS — Z30.09 VASECTOMY EVALUATION: ICD-10-CM

## 2022-09-01 PROCEDURE — 55250 REMOVAL OF SPERM DUCT(S): CPT | Mod: S$GLB,,, | Performed by: UROLOGY

## 2022-09-01 PROCEDURE — 55250 VASECTOMY: ICD-10-PCS | Mod: S$GLB,,, | Performed by: UROLOGY

## 2022-09-01 RX ORDER — LIDOCAINE HYDROCHLORIDE 20 MG/ML
10 INJECTION, SOLUTION INFILTRATION; PERINEURAL ONCE
Status: COMPLETED | OUTPATIENT
Start: 2022-09-01 | End: 2022-09-01

## 2022-09-01 RX ADMIN — LIDOCAINE HYDROCHLORIDE 10 ML: 20 INJECTION, SOLUTION INFILTRATION; PERINEURAL at 11:09

## 2022-09-01 NOTE — PATIENT INSTRUCTIONS
What to Expect After a Vasectomy  You cannot drive or operate heavy machinery on the day of the procedure.    Apply ice packs to the scrotal area for 24-48 hours. Avoid direct contact of the ice pack with the skin. Scrotal supports, jock straps, or fitted underwear help elevate the scrotum and reduce discomfort.    You may shower the next day. Gently apply soapy water to the scrotum to wash. Rinse and dry yourself by blotting the skin, not rubbing.    Avoid strenuous physical exercises or sexual relations for at least one week after a vasectomy.    Continue to use birth control for at least 6 weeks or 10-20 ejaculations. You are still considered fertile until your urologist examines a post-vasectomy semen analysis at 6 weeks and perhaps one at 8 weeks as well. Drop off the specimen at the 4th floor [facility] between 8am and 4pm.    Do NOT resume unprotected sexual activity until your physician finds no sperm in your semen.    All stitches will dissolve on their own in 1-2 weeks.    Signs and Symptoms to Report  A large amount of bleeding at the site  An unusual amount of pain  A large amount of swelling in the scrotum  Fever and chills  Any signs of infection, such as redness at the site or foul-smelling drainage    Risks  The risks of complication after vasectomy are very low. A few of the risks include:  Bleeding  Infection  Scrotal hematoma - a collection of blood in the scrotum  Inflammation of the epididymis - inflammation of a structure next to the testicle that helps in maturation of sperm  Sperm granuloma - a collection of sperm that leaks out from the vas deferens, forming a small nodule or lump. This does not usually cause any discomfort but you may feel it in the scrotum.  Recanalization - the restoration of the lumen or transport tube between the two ends of the vas deferens, possibly causing fertility    If you have any questions or concerns, please call your [facility] urologist at  [phone].

## 2022-09-01 NOTE — PROCEDURES
Vasectomy    Date/Time: 9/1/2022 10:15 AM  Performed by: Conor Tatum MD  Authorized by: Conor Tatum MD        Procedure Date:  09/01/2022    Procedure:                                                                        Bilateral Vasectomy                                                                                              Pre-OP Diagnosis:                                                                 Patient requests sterilization                                          Post-OP Diagnosis:                                                                Patient requests sterilization                                          Anesthesia:                                                                       Anesthesia Administered:                                                     Local anesthetic infiltration, using 6 mL of 2% lidocaine                    (Xylocaine) without epinephrine injected into the skin /                     subcutaneous tissues (administered by surgeon).                         Findings:                                                                         Vas identified bilaterally.                                   Description of Procedure:                                                         Informed Consent:                                                            - Risks, benefits and alternatives of procedure discussed with               patient and informed consent obtained.                                       Patient Position:                                                            - Supine.                                                                    Prep and Drape:                                                              - Patient prepped and draped in usual sterile fashion using povidone         iodine (Betadine).                                                           Incision(s):                                                                  - bilateral scrotal incision ( 1 cm or less).                                                         Instruments:                                                                 - Ring clamp and sharp mosquito.                                             Procedure Details:                                                           - Vas Ends Occluded with: Clips.                                             - Methods of Vas Occlusion: Clips.                                           - Length of Vas Excised: 1.5 cm.                                             Closure:                                                                     - Subcutaneous Tissue: 3-0 Chromic.                                          - Skin: 3-0 Chromic interrupted.                                             Wound Dressing:                                                              - Wound Dressed with: 4 x 4 gauze and scrotal supporter.                     Sponge / Instrument / Needle Counts:                                         - Final counts correct.                                                      Estimated Blood Loss:                                                        - Minimal.                                                                   Pathology Specimen:                                                          - none                                                  Complications:                                                                    No immediate complications.                                             Post-OP Plan:                                                                     Patient was discharged home in a stable condition.         Medications: abx, pain medication given before.         Follow up: [facility] in 1 month.                                                               Post Vasectomy instructions given.

## 2022-12-23 ENCOUNTER — PATIENT MESSAGE (OUTPATIENT)
Dept: INTERNAL MEDICINE | Facility: CLINIC | Age: 37
End: 2022-12-23
Payer: COMMERCIAL

## 2024-02-26 ENCOUNTER — PATIENT MESSAGE (OUTPATIENT)
Dept: INTERNAL MEDICINE | Facility: CLINIC | Age: 39
End: 2024-02-26
Payer: COMMERCIAL

## 2024-02-28 ENCOUNTER — OFFICE VISIT (OUTPATIENT)
Dept: URGENT CARE | Facility: CLINIC | Age: 39
End: 2024-02-28
Payer: COMMERCIAL

## 2024-02-28 VITALS
OXYGEN SATURATION: 98 % | TEMPERATURE: 98 F | HEART RATE: 112 BPM | RESPIRATION RATE: 22 BRPM | HEIGHT: 65 IN | WEIGHT: 187 LBS | DIASTOLIC BLOOD PRESSURE: 78 MMHG | BODY MASS INDEX: 31.16 KG/M2 | SYSTOLIC BLOOD PRESSURE: 133 MMHG

## 2024-02-28 DIAGNOSIS — R09.89 CHEST CONGESTION: ICD-10-CM

## 2024-02-28 DIAGNOSIS — R05.1 ACUTE COUGH: ICD-10-CM

## 2024-02-28 DIAGNOSIS — B96.89 BACTERIAL URI: Primary | ICD-10-CM

## 2024-02-28 DIAGNOSIS — J06.9 BACTERIAL URI: Primary | ICD-10-CM

## 2024-02-28 DIAGNOSIS — R00.0 TACHYCARDIA: ICD-10-CM

## 2024-02-28 PROCEDURE — 99214 OFFICE O/P EST MOD 30 MIN: CPT | Mod: S$GLB,,, | Performed by: NURSE PRACTITIONER

## 2024-02-28 PROCEDURE — 71046 X-RAY EXAM CHEST 2 VIEWS: CPT | Mod: S$GLB,,, | Performed by: STUDENT IN AN ORGANIZED HEALTH CARE EDUCATION/TRAINING PROGRAM

## 2024-02-28 RX ORDER — AMOXICILLIN AND CLAVULANATE POTASSIUM 875; 125 MG/1; MG/1
1 TABLET, FILM COATED ORAL EVERY 12 HOURS
Qty: 14 TABLET | Refills: 0 | Status: SHIPPED | OUTPATIENT
Start: 2024-02-28 | End: 2024-03-06

## 2024-02-28 RX ORDER — PROMETHAZINE HYDROCHLORIDE AND DEXTROMETHORPHAN HYDROBROMIDE 6.25; 15 MG/5ML; MG/5ML
5 SYRUP ORAL EVERY 6 HOURS PRN
Qty: 118 ML | Refills: 0 | Status: SHIPPED | OUTPATIENT
Start: 2024-02-28

## 2024-02-28 RX ORDER — IPRATROPIUM BROMIDE 0.5 MG/2.5ML
0.5 SOLUTION RESPIRATORY (INHALATION)
Status: COMPLETED | OUTPATIENT
Start: 2024-02-28 | End: 2024-02-28

## 2024-02-28 RX ORDER — DEXTROAMPHETAMINE SACCHARATE, AMPHETAMINE ASPARTATE MONOHYDRATE, DEXTROAMPHETAMINE SULFATE AND AMPHETAMINE SULFATE 7.5; 7.5; 7.5; 7.5 MG/1; MG/1; MG/1; MG/1
30 CAPSULE, EXTENDED RELEASE ORAL EVERY MORNING
COMMUNITY
Start: 2024-02-14

## 2024-02-28 RX ADMIN — IPRATROPIUM BROMIDE 0.5 MG: 0.5 SOLUTION RESPIRATORY (INHALATION) at 11:02

## 2024-02-28 NOTE — PATIENT INSTRUCTIONS
Please drink plenty of fluids.  Please get plenty of rest.  You were prescribed antibiotics, please take them to completion.  If you were prescribed a narcotic medication, do not drive or operate heavy equipment or machinery while taking these medications.  If not allergic, please take over the counter Tylenol (Acetaminophen) and/or Motrin (Ibuprofen) as directed for control of pain and/or fever.  Please follow up with your primary care doctor or specialist as needed.  Please return here or go to the Emergency Department for any concerns or worsening of condition.  If you  smoke, please stop smoking.

## 2024-02-28 NOTE — PROGRESS NOTES
"Subjective:      Patient ID: Amol North is a 39 y.o. male.    Vitals:  height is 5' 5" (1.651 m) and weight is 84.8 kg (187 lb). His temperature is 98.4 °F (36.9 °C). His blood pressure is 133/78 and his pulse is 112 (abnormal). His respiration is 22 (abnormal) and oxygen saturation is 98%.     Chief Complaint: Cough (Went to Urgent on 2/18 for Bronchitis (prone to it with asthma), tested neg on Covid and Flu. Given Celestone shot and 5-day Prednisone. Still feel low-grade fever, head and joint aches, fatigue. Cough very productive with yellow/tan sputum. Hoarse. - Entered by patient)    Pt presents with complaint of cough, mucous production, wheezing x2 weeks.  Pt states he was seen at an [facility] and was prescribed steroids and was given a shot of celestone.  Pt states his symptoms had gotten somewhat better but states he is feeling worse.      39-year-old male presents to clinic with complaints of wheezing, cough with thick yellowish-tan sputum, chest tightness. He reports an urgent care visit 10 days ago treated with celestone injection and 5 days of prednisone without resolve, tested negative for covid and influence. History of asthma treating with albuterol MDI, singulair; received Moderna covid vaccines x 4 doses, Pfizer covid vaccine x 1 dose     Cough  This is a new problem. The current episode started 1 to 4 weeks ago. The problem has been waxing and waning. The problem occurs every few minutes. The cough is Productive of sputum. Associated symptoms include nasal congestion and wheezing. Pertinent negatives include no hemoptysis or shortness of breath. He has tried prescription cough suppressant and oral steroids for the symptoms. The treatment provided mild relief.       Respiratory:  Positive for chest tightness, cough, sputum production, wheezing and asthma. Negative for bloody sputum and shortness of breath.         Presents pictures with thick yellowish tan sputum   Allergic/Immunologic: " Positive for asthma.      Objective:     Physical Exam   Constitutional: He is oriented to person, place, and time. He appears well-developed. He is cooperative.  Non-toxic appearance. He does not appear ill. No distress.   HENT:   Head: Normocephalic and atraumatic.   Ears:   Right Ear: Hearing, tympanic membrane, external ear and ear canal normal.   Left Ear: Hearing, tympanic membrane, external ear and ear canal normal.   Nose: Nose normal. No mucosal edema, rhinorrhea or nasal deformity. No epistaxis. Right sinus exhibits no maxillary sinus tenderness and no frontal sinus tenderness. Left sinus exhibits no maxillary sinus tenderness and no frontal sinus tenderness.   Mouth/Throat: Uvula is midline, oropharynx is clear and moist and mucous membranes are normal. No trismus in the jaw. Normal dentition. No uvula swelling. No oropharyngeal exudate, posterior oropharyngeal edema or posterior oropharyngeal erythema.   Eyes: Lids are normal. No scleral icterus. Extraocular movement intact   Neck: Trachea normal and phonation normal. Neck supple. No edema present. No erythema present. No neck rigidity present.   Cardiovascular: Normal rate, regular rhythm and normal heart sounds.   Pulmonary/Chest: Effort normal. No respiratory distress. He has no decreased breath sounds. He has rhonchi.   Scattered crackles via brochial region with cough         Comments: Scattered crackles via brochial region with cough    Abdominal: Normal appearance.   Musculoskeletal: Normal range of motion.         General: No deformity. Normal range of motion.   Neurological: He is alert and oriented to person, place, and time. He exhibits normal muscle tone. Coordination normal.   Skin: Skin is warm, dry, intact, not diaphoretic and not pale.   Psychiatric: His speech is normal and behavior is normal. Judgment and thought content normal.   Nursing note and vitals reviewed.      Assessment:     1. Bacterial URI    2. Acute cough    3. Chest  congestion    4. Tachycardia        Plan:       Bacterial URI  -     amoxicillin-clavulanate 875-125mg (AUGMENTIN) 875-125 mg per tablet; Take 1 tablet by mouth every 12 (twelve) hours. for 7 days  Dispense: 14 tablet; Refill: 0    Acute cough  -     XR CHEST PA AND LATERAL; Future; Expected date: 02/28/2024  -     promethazine-dextromethorphan (PROMETHAZINE-DM) 6.25-15 mg/5 mL Syrp; Take 5 mLs by mouth every 6 (six) hours as needed (cough).  Dispense: 118 mL; Refill: 0    Chest congestion  -     XR CHEST PA AND LATERAL; Future; Expected date: 02/28/2024  -     promethazine-dextromethorphan (PROMETHAZINE-DM) 6.25-15 mg/5 mL Syrp; Take 5 mLs by mouth every 6 (six) hours as needed (cough).  Dispense: 118 mL; Refill: 0    Tachycardia    Other orders  -     ipratropium 0.02 % nebulizer solution 0.5 mg      XR CHEST PA AND LATERAL    Result Date: 2/28/2024  EXAMINATION: XR CHEST PA AND LATERAL CLINICAL HISTORY: Acute cough TECHNIQUE: PA and lateral views of the chest were performed. COMPARISON: None FINDINGS: Normal cardiomediastinal silhouette.  Lungs are clear.  No focal airspace opacity, pleural effusion, or pneumothorax.  Osseous structures appear intact.     No acute cardiopulmonary process. Electronically signed by: Florencio Roberson Date:    02/28/2024 Time:    10:57            Reviewed xray with patient who acknowledged results. Discussed  Diagnosis and treatment plan with patient who verbalized understanding and agrees with plan of care.  Advised on the need to call and schedule a follow-up appointment with pcp. Patient given educational handouts supporting this diagnosis as well.    Patient denies any further questions or concerns at this time.  Patient exits exam room in no acute distress.   Patient Instructions   Please drink plenty of fluids.  Please get plenty of rest.  You were prescribed antibiotics, please take them to completion.  If you were prescribed a narcotic medication, do not drive or operate heavy  equipment or machinery while taking these medications.  If not allergic, please take over the counter Tylenol (Acetaminophen) and/or Motrin (Ibuprofen) as directed for control of pain and/or fever.  Please follow up with your primary care doctor or specialist as needed.  Please return here or go to the Emergency Department for any concerns or worsening of condition.  If you  smoke, please stop smoking.

## 2024-02-29 RX ORDER — FLUTICASONE PROPIONATE AND SALMETEROL 250; 50 UG/1; UG/1
1 POWDER RESPIRATORY (INHALATION) 2 TIMES DAILY
Qty: 1 EACH | Refills: 5 | Status: SHIPPED | OUTPATIENT
Start: 2024-02-29 | End: 2024-03-12 | Stop reason: ALTCHOICE

## 2024-02-29 NOTE — TELEPHONE ENCOUNTER
No care due was identified.  [facility] Embedded Care Due Messages. Reference number: [id number].   2/29/2024 12:38:03 AM CST

## 2024-02-29 NOTE — TELEPHONE ENCOUNTER
Lf-1/26/21  Lov-7/11/2022  Next-3/26/24  Patient comment: With my asthma acting up with the bronchitis, I was wondering if i could go back on the Advair for at least a few months to insure things go back to normal.

## 2024-03-11 ENCOUNTER — OFFICE VISIT (OUTPATIENT)
Dept: URGENT CARE | Facility: CLINIC | Age: 39
End: 2024-03-11
Payer: COMMERCIAL

## 2024-03-11 VITALS
RESPIRATION RATE: 15 BRPM | HEART RATE: 89 BPM | DIASTOLIC BLOOD PRESSURE: 76 MMHG | OXYGEN SATURATION: 99 % | BODY MASS INDEX: 29.99 KG/M2 | WEIGHT: 180 LBS | TEMPERATURE: 99 F | SYSTOLIC BLOOD PRESSURE: 112 MMHG | HEIGHT: 65 IN

## 2024-03-11 DIAGNOSIS — R05.9 COUGH, UNSPECIFIED TYPE: Primary | ICD-10-CM

## 2024-03-11 DIAGNOSIS — R09.82 POSTNASAL DRIP: ICD-10-CM

## 2024-03-11 DIAGNOSIS — J45.909 ASTHMA, UNSPECIFIED ASTHMA SEVERITY, UNSPECIFIED WHETHER COMPLICATED, UNSPECIFIED WHETHER PERSISTENT: ICD-10-CM

## 2024-03-11 DIAGNOSIS — J30.9 ALLERGIC RHINITIS, UNSPECIFIED SEASONALITY, UNSPECIFIED TRIGGER: ICD-10-CM

## 2024-03-11 PROCEDURE — 99214 OFFICE O/P EST MOD 30 MIN: CPT | Mod: S$GLB,,, | Performed by: FAMILY MEDICINE

## 2024-03-11 PROCEDURE — 71046 X-RAY EXAM CHEST 2 VIEWS: CPT | Mod: S$GLB,,, | Performed by: RADIOLOGY

## 2024-03-11 RX ORDER — LORATADINE 10 MG/1
10 TABLET ORAL DAILY
Qty: 30 TABLET | Refills: 0 | Status: SHIPPED | OUTPATIENT
Start: 2024-03-11 | End: 2024-05-14

## 2024-03-11 RX ORDER — METHYLPREDNISOLONE 4 MG/1
TABLET ORAL
Qty: 21 TABLET | Refills: 0 | Status: SHIPPED | OUTPATIENT
Start: 2024-03-11 | End: 2024-05-14 | Stop reason: ALTCHOICE

## 2024-03-11 RX ORDER — PROMETHAZINE HYDROCHLORIDE AND DEXTROMETHORPHAN HYDROBROMIDE 6.25; 15 MG/5ML; MG/5ML
5 SYRUP ORAL 3 TIMES DAILY PRN
Qty: 120 ML | Refills: 0 | Status: SHIPPED | OUTPATIENT
Start: 2024-03-11 | End: 2024-03-21

## 2024-03-11 NOTE — PROGRESS NOTES
"Subjective:      Patient ID: Amol North is a 39 y.o. male.    Chief Complaint: No chief complaint on file.    HPI  Patient seen/evaluated in person at this visit of 3/12/2024.    Mr. North comes to [facility] as a New Patient for evaluation of persistent cough and asthma.  He reports asthma beginning in his childhood that has been better controlled in adulthood.  He describes somewhat seasonal (early spring and winter) flares of asthma symptoms attributed to allergies.  Most of the time, his symptoms respond to rescue albuterol inhaler use and occasional steroid taper.  However, this year he has had persistent cough and asthma symptoms since mid-February.  He has been using Advair for about a week but stopped it yesterday due to hoarseness.    Recent Urgent Care visits in 2024:  [facility] on 2/18 => given celestone shot + prescriptions for Singulair; prednisone; and cough syrup  [facility] on 2/28 => clear CXR + prescriptions for Augmentin and cough syrup  [facility] on 3/11 => clear CXR + prescriptions for Medrol; Claritin; and cough syrup    Pulmonary History:  Asthma diagnosed in 1st grade => never hospitalized but frequent doctor/ED visits  Allergy shots until teen years (none after high school)  Epi-pen on occasion  Asthma regimen => off/on Singulair and Advair; uses rescue albuterol MDI rescue  No home oxygen, nebulizer, or PAP therapy  Denies smoking/vaping.  Suspected COVID in January 2020 during initial wave of pandemia => not hospitalized but "pretty sick"    Teaches Freshman Composition (English) at [employer]     Review of Systems   Constitutional:  Negative for fever, chills, weight loss, activity change, fatigue and night sweats.   HENT:  Positive for voice change.    Respiratory:  Positive for cough, wheezing, asthma nighttime symptoms and use of rescue inhaler. Negative for hemoptysis, sputum production, chest tightness, shortness of breath, previous hospitialization due to pulmonary " "problems, pleurisy and dyspnea on extertion.    Cardiovascular:  Negative for chest pain, palpitations and leg swelling.   Gastrointestinal:  Negative for acid reflux.     Objective:     Physical Exam   Constitutional: He is oriented to person, place, and time. No distress.   Cardiovascular: Normal rate, regular rhythm and normal heart sounds. Exam reveals no gallop.   No murmur heard.  Pulmonary/Chest: Normal expansion, symmetric chest wall expansion, effort normal and breath sounds normal. No stridor. No respiratory distress. He has no decreased breath sounds. He has no wheezes. He has no rhonchi. He has no rales.   Musculoskeletal:         General: No edema.   Neurological: He is alert and oriented to person, place, and time. Gait normal.   Skin: No cyanosis. Nails show no clubbing.   Psychiatric: He has a normal mood and affect. Judgment normal.   Nursing note and vitals reviewed.    Personal Diagnostic Review    PFTs show normal spirometry, lung volumes, and DLCO with no bronchodilator response.    PFTs 3/12/2024    FVC  (pre-BD) 3.78  liters   FVC%  86%   FEV1 (pre-BD) 3.27 liters   FEV1%  91%   FEV1/FVC  87   FVC (post-BD) 3.85 liters   FVC% + 2%   FEV1 (post-BD) 3.37 liters   FEV1% + 3%   SVC 3.87 liters   SVC% 88%   TLC  5.28 liters   TLC%  86%   RV  1.41 liters   RV%  78%   DLCO  25.16 mL/mmHg/min   DLCO%  87%   VA 4.66 liters   IVC 3.87 liters      01/30/21 11:37 07/11/22 15:50   Eos % 2.7 3.9   Eos # 0.1 0.2             3/12/2024     2:59 PM 3/11/2024     2:22 PM 2/28/2024    10:21 AM 9/1/2022    10:17 AM 8/5/2022     9:34 AM 7/11/2022     3:08 PM 6/11/2021    10:47 AM   Pulmonary Function Tests   SpO2 98 % 99 % 98 %   98 % 99 %   Height 5' 5" (1.651 m) 5' 5" (1.651 m) 5' 5" (1.651 m) 5' 5" (1.651 m) 5' 5" (1.651 m) 5' 5" (1.651 m) 5' 5" (1.651 m)   Weight 80.1 kg (176 lb 9.4 oz) 81.6 kg (180 lb) 84.8 kg (187 lb) 85 kg (187 lb 6.4 oz) 85.6 kg (188 lb 11.4 oz) 85 kg (187 lb 6.3 oz) 73.8 kg (162 lb 11.2 " oz)   BMI (Calculated) 29.4 30 31.1 31.2 31.4 31.2 27.1        Assessment:     1. Mild intermittent asthma without complication         Outpatient Encounter Medications as of 3/12/2024   Medication Sig Dispense Refill    albuterol (VENTOLIN HFA) 90 mcg/actuation inhaler Inhale 2 puffs into the lungs every 4 (four) hours as needed for Wheezing. Rescue 18 g 5    ALBUTEROL, BULK, MISC       dextroamphetamine-amphetamine (ADDERALL XR) 30 MG 24 hr capsule       methylPREDNISolone (MEDROL DOSEPACK) 4 mg tablet Take as directed 21 tablet 0    montelukast (SINGULAIR) 10 mg tablet TAKE 1 TABLET BY MOUTH EVERY DAY IN THE EVENING 90 tablet 1    promethazine-dextromethorphan (PROMETHAZINE-DM) 6.25-15 mg/5 mL Syrp Take 5 mLs by mouth every 6 (six) hours as needed (cough). 118 mL 0    promethazine-dextromethorphan (PROMETHAZINE-DM) 6.25-15 mg/5 mL Syrp Take 5 mLs by mouth 3 (three) times daily as needed (cough). 120 mL 0    albuterol (ACCUNEB) 1.25 mg/3 mL Nebu Take 3 mLs (1.25 mg total) by nebulization every 6 (six) hours as needed (Wheezing). Rescue 75 mL 0    budesonide-formoterol 160-4.5 mcg (SYMBICORT) 160-4.5 mcg/actuation HFAA Inhale 2 puffs into the lungs every 12 (twelve) hours. Controller 10.2 g 3    buPROPion (WELLBUTRIN XL) 150 MG TB24 tablet Take 1 tablet (150 mg total) by mouth once daily. 30 tablet 11    fluticasone propionate (FLONASE) 50 mcg/actuation nasal spray USE 1 SPRAY (50 MCG TOTAL) IN EACH NOSTRIL ONCE DAILY (Patient not taking: Reported on 3/12/2024) 48 mL 3    loratadine (CLARITIN) 10 mg tablet Take 1 tablet (10 mg total) by mouth once daily. (Patient not taking: Reported on 3/12/2024) 30 tablet 0    [DISCONTINUED] fluticasone-salmeterol diskus inhaler 250-50 mcg Inhale 1 puff into the lungs 2 (two) times a day. 1 each 5     No facility-administered encounter medications on file as of 3/12/2024.     Orders Placed This Encounter   Procedures    NEBULIZER FOR HOME USE     Order Specific Question:    "Height:     Answer:   5' 5" (1.651 m)     Order Specific Question:   Weight:     Answer:   80.1 kg (176 lb 9.4 oz)     Order Specific Question:   Length of need (1-99 months):     Answer:   99    NEBULIZER KIT (SUPPLIES) FOR HOME USE     Order Specific Question:   Height:     Answer:   5' 5" (1.651 m)     Order Specific Question:   Weight:     Answer:   80.1 kg (176 lb 9.4 oz)     Order Specific Question:   Length of need (1-99 months):     Answer:   99     Order Specific Question:   Mask or Mouthpiece?     Answer:   Mouthpiece       Plan:     Although PFTs look okay, we will continue to treat for inadequately controlled asthma (likely extrinsic given the history of allergy/atopy).  Steroid taper as prescribed by Urgent Care  Continue LABA/ICS with rescue albuterol => switched Advair to Symbicort given recent hoarseness  Nebulizer and nebulized albuterol twice daily during steroid taper then as needed.  Assuming improvement over the next few weeks => check pre/post spirometry; CBC/diff; total IgE; selective IgEs in about 1 month      Problem List Items Addressed This Visit       Asthma - Primary    Overview     - Asthma diagnosed in 1st grade => never hospitalized but frequent doctor/ED visits  - Allergy shots until teen years (none after high school); occasional Epi-pen use         Current Assessment & Plan     Symptoms of increased cough/asthma in mid-February 2024  Recent Urgent Care visits in 2024:  - [facility] on 2/18 => given celestone shot + prescriptions for Singulair; prednisone; and cough syrup  - [facility] on 2/28 => clear CXR + prescriptions for Augmentin and cough syrup  - [facility] on 3/11 => clear CXR + prescriptions for Medrol; Claritin; and cough syrup  Asthma regimen => Singulair and Advair; uses rescue albuterol MDI rescue  PFTs on 3/12/2024 are normal      Agree with Singulair and albuterol => course of steroids; Advair switched to Symbicort due to hoarseness  Nebulizer with nebulized albuterol twice " daily for the next week or so  Once current flare resolves => look for allergic/immunologic triggers (CBC/diff; IgE; selective IgEs)  May benefit from outpatient Allergy/Immunology evaluation         Relevant Medications    albuterol (ACCUNEB) 1.25 mg/3 mL Nebu    budesonide-formoterol 160-4.5 mcg (SYMBICORT) 160-4.5 mcg/actuation HFAA    Other Relevant Orders    NEBULIZER FOR HOME USE    NEBULIZER KIT (SUPPLIES) FOR HOME USE       Note copied to Dr. Rizvi (Primary Care)    Total Time = 45 minutes    Juan C Polo MD  Pulmonary Disease  [facility] 9th Fl

## 2024-03-11 NOTE — PROGRESS NOTES
"Subjective:      Patient ID: Amol North is a 39 y.o. male.    Vitals:  height is 5' 5" (1.651 m) and weight is 81.6 kg (180 lb). His temperature is 98.9 °F (37.2 °C). His blood pressure is 112/76 and his pulse is 89. His respiration is 15 and oxygen saturation is 99%.     Chief Complaint: Cough (Asthmatic. Ill since 2/15, diagnosed with bronchitis on 2/18 given Celestone shot and 5-day Prednisone, worsened after done, on 2/29 given nebulizer and 7-day Augmentin. Chill, aches, cough improved but back & worsening again. Sore throat & wheezing. - Entered by patient)    39 years old male with history of asthma and sinus allergies presents with complaint of lingering cough and congestion for 1 month.  Reports chest congestion, sinus congestion, postnasal drip, occasional wheezing, chills and sweats.  Denies chest pain, fever.  Patient was seen here 2 weeks ago and was treated with Augmentin and cough medicine.  Patient reports relief with antibiotics but symptoms came back after stopping the antibiotics.    Cough  This is a new problem. Episode onset: 10 Days. The problem has been unchanged. The problem occurs constantly. The cough is Non-productive. Associated symptoms include chills, headaches, myalgias, postnasal drip, a sore throat, shortness of breath, sweats and wheezing. Associated symptoms comments: No chest tightening when wheezing. The symptoms are aggravated by lying down. He has tried prescription cough suppressant (ibuprofen) for the symptoms. The treatment provided mild relief.       Constitution: Positive for chills.   HENT:  Positive for postnasal drip and sore throat.    Respiratory:  Positive for cough, shortness of breath and wheezing.    Musculoskeletal:  Positive for muscle ache.   Neurological:  Positive for headaches.      Objective:     Physical Exam   Constitutional: He is oriented to person, place, and time. He appears well-developed. He is cooperative.  Non-toxic appearance. He does " not appear ill. No distress.   HENT:   Head: Normocephalic and atraumatic.   Ears:   Right Ear: Hearing, tympanic membrane, external ear and ear canal normal. impacted cerumen  Left Ear: Hearing, tympanic membrane, external ear and ear canal normal. impacted cerumen  Nose: Congestion present. No mucosal edema, rhinorrhea or nasal deformity. No epistaxis. Right sinus exhibits no maxillary sinus tenderness and no frontal sinus tenderness. Left sinus exhibits no maxillary sinus tenderness and no frontal sinus tenderness.   Mouth/Throat: Uvula is midline, oropharynx is clear and moist and mucous membranes are normal. No trismus in the jaw. Normal dentition. No uvula swelling. No oropharyngeal exudate, posterior oropharyngeal edema or posterior oropharyngeal erythema.   Eyes: Conjunctivae and lids are normal. No scleral icterus.   Neck: Trachea normal and phonation normal. Neck supple. No edema present. No erythema present. No neck rigidity present.   Cardiovascular: Normal rate, regular rhythm, normal heart sounds and normal pulses.   No murmur heard.  Pulmonary/Chest: Effort normal and breath sounds normal. No stridor. No respiratory distress. He has no decreased breath sounds. He has no wheezes. He has no rhonchi. He has no rales.   Abdominal: Normal appearance. He exhibits no distension. There is no abdominal tenderness.   Musculoskeletal: Normal range of motion.         General: No deformity. Normal range of motion.      Cervical back: He exhibits no tenderness.   Lymphadenopathy:     He has no cervical adenopathy.   Neurological: He is alert, oriented to person, place, and time and at baseline. He exhibits normal muscle tone. Coordination normal.   Skin: Skin is warm, dry, intact, not diaphoretic and not pale.   Psychiatric: His speech is normal and behavior is normal. Judgment and thought content normal.   Nursing note and vitals reviewed.      Assessment:     1. Cough, unspecified type    2. Asthma, unspecified  asthma severity, unspecified whether complicated, unspecified whether persistent    3. Allergic rhinitis, unspecified seasonality, unspecified trigger    4. Postnasal drip        Plan:   CXR is negative for pneumonia.   Discussed exam findings/results/diagnosis/plan with patient. Advised to f/u with PCP within 2-5 days. ER precautions given if symptoms get any worse. All questions answered. Patient verbally understood and agreed with treatment plan.  Educational materials and instructions regarding the visit diagnosis and management provided.     Cough, unspecified type  -     X-Ray Chest PA And Lateral; Future; Expected date: 03/11/2024    Asthma, unspecified asthma severity, unspecified whether complicated, unspecified whether persistent  -     Ambulatory referral/consult to Pulmonology    Allergic rhinitis, unspecified seasonality, unspecified trigger    Postnasal drip    Other orders  -     methylPREDNISolone (MEDROL DOSEPACK) 4 mg tablet; Take as directed  Dispense: 21 tablet; Refill: 0  -     promethazine-dextromethorphan (PROMETHAZINE-DM) 6.25-15 mg/5 mL Syrp; Take 5 mLs by mouth 3 (three) times daily as needed (cough).  Dispense: 120 mL; Refill: 0  -     loratadine (CLARITIN) 10 mg tablet; Take 1 tablet (10 mg total) by mouth once daily.  Dispense: 30 tablet; Refill: 0

## 2024-03-12 ENCOUNTER — TELEPHONE (OUTPATIENT)
Dept: PULMONOLOGY | Facility: CLINIC | Age: 39
End: 2024-03-12
Payer: COMMERCIAL

## 2024-03-12 ENCOUNTER — HOSPITAL ENCOUNTER (OUTPATIENT)
Dept: PULMONOLOGY | Facility: CLINIC | Age: 39
Discharge: HOME | End: 2024-03-12
Payer: COMMERCIAL

## 2024-03-12 ENCOUNTER — OFFICE VISIT (OUTPATIENT)
Dept: PULMONOLOGY | Facility: CLINIC | Age: 39
End: 2024-03-12
Payer: COMMERCIAL

## 2024-03-12 VITALS
HEIGHT: 65 IN | SYSTOLIC BLOOD PRESSURE: 120 MMHG | HEART RATE: 106 BPM | OXYGEN SATURATION: 98 % | BODY MASS INDEX: 29.42 KG/M2 | DIASTOLIC BLOOD PRESSURE: 72 MMHG | WEIGHT: 176.56 LBS

## 2024-03-12 DIAGNOSIS — J45.909 ASTHMA, UNSPECIFIED ASTHMA SEVERITY, UNSPECIFIED WHETHER COMPLICATED, UNSPECIFIED WHETHER PERSISTENT: ICD-10-CM

## 2024-03-12 DIAGNOSIS — J45.909 ASTHMA, UNSPECIFIED ASTHMA SEVERITY, UNSPECIFIED WHETHER COMPLICATED, UNSPECIFIED WHETHER PERSISTENT: Primary | ICD-10-CM

## 2024-03-12 DIAGNOSIS — J45.20 MILD INTERMITTENT ASTHMA WITHOUT COMPLICATION: Primary | ICD-10-CM

## 2024-03-12 LAB
DLCO SINGLE BREATH LLN: 21.95
DLCO SINGLE BREATH PRE REF: 87.1 %
DLCO SINGLE BREATH REF: 28.88
DLCOC SBVA LLN: 3.28
DLCOC SBVA REF: 4.73
DLCOC SINGLE BREATH LLN: 21.95
DLCOC SINGLE BREATH REF: 28.88
DLCOCSBVAULN: 6.17
DLCOCSINGLEBREATHULN: 35.81
DLCOSINGLEBREATHULN: 35.81
DLCOVA LLN: 3.28
DLCOVA PRE REF: 114.3 %
DLCOVA PRE: 5.4 ML/(MIN*MMHG*L) (ref 3.28–6.17)
DLCOVA REF: 4.73
DLCOVAULN: 6.17
ERV LLN: -16448.68
ERV PRE REF: 78.6 %
ERV REF: 1.32
ERVULN: ABNORMAL
FEF 25 75 LLN: 2.11
FEF 25 75 PRE REF: 114 %
FEF 25 75 REF: 3.62
FET100 CHG: -9.6 %
FEV05 LLN: 1.55
FEV05 REF: 2.68
FEV1 CHG: 2.9 %
FEV1 FVC LLN: 71
FEV1 FVC PRE REF: 106 %
FEV1 FVC REF: 82
FEV1 LLN: 2.84
FEV1 PRE REF: 91.4 %
FEV1 REF: 3.58
FEV1 VOL CHG: 0.09
FRCPLETH LLN: 2.15
FRCPLETH PREREF: 78.3 %
FRCPLETH REF: 3.13
FRCPLETHULN: 4.12
FVC CHG: 1.8 %
FVC LLN: 3.49
FVC PRE REF: 86.1 %
FVC REF: 4.39
FVC VOL CHG: 0.07
IVC PRE: 3.87 L (ref 3.49–5.3)
IVC SINGLE BREATH LLN: 3.49
IVC SINGLE BREATH PRE REF: 88 %
IVC SINGLE BREATH REF: 4.39
IVCSINGLEBREATHULN: 5.3
LLN IC: -9999997.02
PEF LLN: 7.09
PEF PRE REF: 80.5 %
PEF REF: 9.08
PHYSICIAN COMMENT: ABNORMAL
POST FEF 25 75: 4.41 L/S (ref 2.11–5.54)
POST FET 100: 7.46 SEC
POST FEV1 FVC: 87.5 % (ref 71.13–90.64)
POST FEV1: 3.37 L (ref 2.84–4.3)
POST FEV5: 2.54 L (ref 1.55–3.82)
POST FVC: 3.85 L (ref 3.49–5.3)
POST PEF: 5.68 L/S (ref 7.09–11.08)
PRE DLCO: 25.16 ML/(MIN*MMHG) (ref 21.95–35.81)
PRE ERV: 1.04 L (ref -16448.68–16451.32)
PRE FEF 25 75: 4.13 L/S (ref 2.11–5.54)
PRE FET 100: 8.25 SEC
PRE FEV05 REF: 97.5 %
PRE FEV1 FVC: 86.59 % (ref 71.13–90.64)
PRE FEV1: 3.27 L (ref 2.84–4.3)
PRE FEV5: 2.62 L (ref 1.55–3.82)
PRE FRC PL: 2.45 L (ref 2.15–4.12)
PRE FVC: 3.78 L (ref 3.49–5.3)
PRE IC: 2.83 L (ref -9999997.02–#######.####)
PRE PEF: 7.32 L/S (ref 7.09–11.08)
PRE REF IC: 94.9 %
PRE RV: 1.41 L (ref 1.14–2.49)
PRE TLC: 5.28 L (ref 4.96–7.26)
RAW PRE REF: 109.3 %
RAW PRE: 3.34 CMH2O*S/L (ref 3.06–3.06)
RAW REF: 3.06
REF IC: 2.98
RV LLN: 1.14
RV PRE REF: 78 %
RV REF: 1.81
RVTLC LLN: 21
RVTLC PRE REF: 90.6 %
RVTLC PRE: 26.78 % (ref 20.58–38.54)
RVTLC REF: 30
RVTLCULN: 39
RVULN: 2.49
SGAW PRE REF: 122.9 %
SGAW PRE: 0.1 1/(CMH2O*S) (ref 0.08–0.08)
SGAW REF: 0.08
TLC LLN: 4.96
TLC PRE REF: 86.4 %
TLC REF: 6.11
TLC ULN: 7.26
ULN IC: ABNORMAL
VA PRE: 4.66 L (ref 5.96–5.96)
VA SINGLE BREATH LLN: 5.96
VA SINGLE BREATH PRE REF: 78.1 %
VA SINGLE BREATH REF: 5.96
VASINGLEBREATHULN: 5.96
VC LLN: 3.49
VC PRE REF: 88 %
VC PRE: 3.87 L (ref 3.49–5.3)
VC REF: 4.39
VC ULN: 5.3

## 2024-03-12 PROCEDURE — 99999 PR PBB SHADOW E&M-EST. PATIENT-LVL III: CPT | Mod: PBBFAC,,, | Performed by: INTERNAL MEDICINE

## 2024-03-12 PROCEDURE — 94060 EVALUATION OF WHEEZING: CPT | Mod: S$GLB,,, | Performed by: INTERNAL MEDICINE

## 2024-03-12 PROCEDURE — 1159F MED LIST DOCD IN RCRD: CPT | Mod: CPTII,S$GLB,, | Performed by: INTERNAL MEDICINE

## 2024-03-12 PROCEDURE — 94729 DIFFUSING CAPACITY: CPT | Mod: S$GLB,,, | Performed by: INTERNAL MEDICINE

## 2024-03-12 PROCEDURE — 99203 OFFICE O/P NEW LOW 30 MIN: CPT | Mod: 25,S$GLB,, | Performed by: INTERNAL MEDICINE

## 2024-03-12 PROCEDURE — 3074F SYST BP LT 130 MM HG: CPT | Mod: CPTII,S$GLB,, | Performed by: INTERNAL MEDICINE

## 2024-03-12 PROCEDURE — 94726 PLETHYSMOGRAPHY LUNG VOLUMES: CPT | Mod: S$GLB,,, | Performed by: INTERNAL MEDICINE

## 2024-03-12 PROCEDURE — 3078F DIAST BP <80 MM HG: CPT | Mod: CPTII,S$GLB,, | Performed by: INTERNAL MEDICINE

## 2024-03-12 PROCEDURE — 3008F BODY MASS INDEX DOCD: CPT | Mod: CPTII,S$GLB,, | Performed by: INTERNAL MEDICINE

## 2024-03-12 RX ORDER — BUDESONIDE AND FORMOTEROL FUMARATE DIHYDRATE 160; 4.5 UG/1; UG/1
2 AEROSOL RESPIRATORY (INHALATION) EVERY 12 HOURS
Qty: 10.2 G | Refills: 3 | Status: SHIPPED | OUTPATIENT
Start: 2024-03-12 | End: 2025-03-12

## 2024-03-12 RX ORDER — ALBUTEROL SULFATE 1.25 MG/3ML
1.25 SOLUTION RESPIRATORY (INHALATION) EVERY 6 HOURS PRN
Qty: 75 ML | Refills: 0 | Status: SHIPPED | OUTPATIENT
Start: 2024-03-12 | End: 2025-03-12

## 2024-03-15 NOTE — ASSESSMENT & PLAN NOTE
Symptoms of increased cough/asthma in mid-February 2024  Recent Urgent Care visits in 2024:  - [facility] on 2/18 => given celestone shot + prescriptions for Singulair; prednisone; and cough syrup  - [facility] on 2/28 => clear CXR + prescriptions for Augmentin and cough syrup  - [facility] on 3/11 => clear CXR + prescriptions for Medrol; Claritin; and cough syrup  Asthma regimen => Singulair and Advair; uses rescue albuterol MDI rescue  PFTs on 3/12/2024 are normal      Agree with Singulair and albuterol => course of steroids; Advair switched to Symbicort due to hoarseness  Nebulizer with nebulized albuterol twice daily for the next week or so  Once current flare resolves => look for allergic/immunologic triggers (CBC/diff; IgE; selective IgEs)  May benefit from outpatient Allergy/Immunology evaluation

## 2024-03-26 ENCOUNTER — OFFICE VISIT (OUTPATIENT)
Dept: INTERNAL MEDICINE | Facility: CLINIC | Age: 39
End: 2024-03-26
Payer: COMMERCIAL

## 2024-03-26 VITALS
RESPIRATION RATE: 18 BRPM | HEIGHT: 65 IN | DIASTOLIC BLOOD PRESSURE: 80 MMHG | TEMPERATURE: 98 F | SYSTOLIC BLOOD PRESSURE: 106 MMHG | BODY MASS INDEX: 29.24 KG/M2 | OXYGEN SATURATION: 99 % | HEART RATE: 92 BPM | WEIGHT: 175.5 LBS

## 2024-03-26 DIAGNOSIS — K58.9 IRRITABLE BOWEL SYNDROME, UNSPECIFIED TYPE: ICD-10-CM

## 2024-03-26 DIAGNOSIS — Z00.00 PHYSICAL EXAM, ANNUAL: Primary | ICD-10-CM

## 2024-03-26 DIAGNOSIS — F41.0 GENERALIZED ANXIETY DISORDER WITH PANIC ATTACKS: ICD-10-CM

## 2024-03-26 DIAGNOSIS — J45.40 MODERATE PERSISTENT ASTHMATIC BRONCHITIS WITHOUT COMPLICATION: ICD-10-CM

## 2024-03-26 DIAGNOSIS — F33.1 MODERATE EPISODE OF RECURRENT MAJOR DEPRESSIVE DISORDER: ICD-10-CM

## 2024-03-26 DIAGNOSIS — F41.1 GENERALIZED ANXIETY DISORDER WITH PANIC ATTACKS: ICD-10-CM

## 2024-03-26 PROCEDURE — 99396 PREV VISIT EST AGE 40-64: CPT | Mod: 25,S$GLB,, | Performed by: STUDENT IN AN ORGANIZED HEALTH CARE EDUCATION/TRAINING PROGRAM

## 2024-03-26 PROCEDURE — 3079F DIAST BP 80-89 MM HG: CPT | Mod: CPTII,S$GLB,, | Performed by: STUDENT IN AN ORGANIZED HEALTH CARE EDUCATION/TRAINING PROGRAM

## 2024-03-26 PROCEDURE — 3074F SYST BP LT 130 MM HG: CPT | Mod: CPTII,S$GLB,, | Performed by: STUDENT IN AN ORGANIZED HEALTH CARE EDUCATION/TRAINING PROGRAM

## 2024-03-26 PROCEDURE — 90715 TDAP VACCINE 7 YRS/> IM: CPT | Mod: S$GLB,,, | Performed by: STUDENT IN AN ORGANIZED HEALTH CARE EDUCATION/TRAINING PROGRAM

## 2024-03-26 PROCEDURE — 1159F MED LIST DOCD IN RCRD: CPT | Mod: CPTII,S$GLB,, | Performed by: STUDENT IN AN ORGANIZED HEALTH CARE EDUCATION/TRAINING PROGRAM

## 2024-03-26 PROCEDURE — 3008F BODY MASS INDEX DOCD: CPT | Mod: CPTII,S$GLB,, | Performed by: STUDENT IN AN ORGANIZED HEALTH CARE EDUCATION/TRAINING PROGRAM

## 2024-03-26 PROCEDURE — 90471 IMMUNIZATION ADMIN: CPT | Mod: S$GLB,,, | Performed by: STUDENT IN AN ORGANIZED HEALTH CARE EDUCATION/TRAINING PROGRAM

## 2024-03-26 PROCEDURE — 99214 OFFICE O/P EST MOD 30 MIN: CPT | Mod: 25,S$GLB,, | Performed by: STUDENT IN AN ORGANIZED HEALTH CARE EDUCATION/TRAINING PROGRAM

## 2024-03-26 PROCEDURE — 99999 PR PBB SHADOW E&M-EST. PATIENT-LVL IV: CPT | Mod: PBBFAC,,, | Performed by: STUDENT IN AN ORGANIZED HEALTH CARE EDUCATION/TRAINING PROGRAM

## 2024-03-26 RX ORDER — FLUTICASONE PROPIONATE AND SALMETEROL 250; 50 UG/1; UG/1
1 POWDER RESPIRATORY (INHALATION) 2 TIMES DAILY
COMMUNITY
Start: 2024-03-23 | End: 2024-05-14

## 2024-03-26 NOTE — PROGRESS NOTES
Subjective:      Chief Complaint: Annual Exam, urine issues (Notice foaming urine off and on, not sure if related to IBS), GI Problem (IBS-abd cramping and bloating Tx options/), and Immunizations    HPI  Mr. Amol North is a  39-year-old man with anxiety/depression/ADHD ( Wellbutrin, generic Adderall, and cognitive behavioral therapy) presenting for annual physical in addition to a issues as below; as these are different appointment types, legality dictates they be billed/coded accordingly:    IBS:  - formal Dx via GI  - no prior EGD/colonoscopy or pharmacotherapy trials  - continues to experience symptoms of bloating with characteristic consumption (broccoli) and GI upset/cramping/diarrhea with spicy foods    Foaming urine:  - occurring during episodes of IBS flares as above (most recent within the past week)    Asthma/lymphadenopathy:  - s/p recent Po steroids, IM steroids, montelukast, and [place] [facility]  - subsequently establish with pulmonology     Family, social, surgical Hx reviewed     Health Maintenance:  Due for  annual screening labs and routine vaccinations (tetanus and pneumococcal)    Past Medical History:   Diagnosis Date    Anxiety     Asthma     Depression     IBS (irritable bowel syndrome)      No past surgical history on file.  Family History   Problem Relation Age of Onset    Anxiety disorder Mother     Anxiety disorder Father      Social History     Socioeconomic History    Marital status: Significant Other   Occupational History    Occupation: PHD student    Tobacco Use    Smoking status: Never    Smokeless tobacco: Never   Substance and Sexual Activity    Alcohol use: Yes     Comment: occ;     Drug use: Yes     Types: Marijuana     Comment: none currently     Sexual activity: Yes     Partners: Female     Social Determinants of Health     Financial Resource Strain: Low Risk  (3/12/2024)    Overall Financial Resource Strain (CARDIA)     Difficulty of Paying Living Expenses: Not  very hard   Food Insecurity: No Food Insecurity (3/12/2024)    Hunger Vital Sign     Worried About Running Out of Food in the Last Year: Never true     Ran Out of Food in the Last Year: Never true   Transportation Needs: Unmet Transportation Needs (3/12/2024)    PRAPARE - Transportation     Lack of Transportation (Medical): Yes     Lack of Transportation (Non-Medical): No   Physical Activity: Insufficiently Active (3/12/2024)    Exercise Vital Sign     Days of Exercise per Week: 2 days     Minutes of Exercise per Session: 20 min   Stress: Stress Concern Present (3/12/2024)    [place] [employer] - Occupational Stress Questionnaire     Feeling of Stress : To some extent   Social Connections: Unknown (3/12/2024)    Social Connection and Isolation Panel [NHANES]     Frequency of Communication with Friends and Family: Never     Frequency of Social Gatherings with Friends and Family: Three times a week     Active Member of Clubs or Organizations: No     Attends Club or Organization Meetings: Never     Marital Status: Living with partner   Housing Stability: Low Risk  (3/12/2024)    Housing Stability Vital Sign     Unable to Pay for Housing in the Last Year: No     Number of Places Lived in the Last Year: 1     Unstable Housing in the Last Year: No     Review of patient's allergies indicates:   Allergen Reactions    Biaxin [clarithromycin] Palpitations     Amol North had no medications administered during this visit.      Review of Systems   Constitutional:  Negative for activity change and unexpected weight change.   HENT:  Negative for hearing loss, rhinorrhea and trouble swallowing.    Eyes:  Negative for discharge and visual disturbance.   Respiratory:  Positive for wheezing. Negative for chest tightness.    Cardiovascular:  Negative for chest pain and palpitations.   Gastrointestinal:  Negative for blood in stool, constipation, diarrhea and vomiting.   Endocrine: Negative for polydipsia  and polyuria.   Genitourinary:  Negative for difficulty urinating, hematuria and urgency.   Musculoskeletal:  Negative for arthralgias, joint swelling and neck pain.   Neurological:  Positive for headaches. Negative for weakness.   Psychiatric/Behavioral:  Negative for confusion and dysphoric mood.          Objective:      Vitals:    03/26/24 1514   BP: 106/80   Pulse: 92   Resp: 18   Temp: 98.1 °F (36.7 °C)      Physical Exam  Vitals reviewed.   Constitutional:       General: He is not in acute distress.     Appearance: Normal appearance.   HENT:      Head: Normocephalic and atraumatic.      Comments: Facial features are symmetric      Nose: Nose normal. No congestion or rhinorrhea.      Mouth/Throat:      Mouth: Mucous membranes are moist.      Pharynx: Oropharynx is clear. No oropharyngeal exudate or posterior oropharyngeal erythema.   Eyes:      General: No scleral icterus.     Extraocular Movements: Extraocular movements intact.      Conjunctiva/sclera: Conjunctivae normal.   Cardiovascular:      Rate and Rhythm: Normal rate and regular rhythm.      Pulses: Normal pulses.      Heart sounds: Normal heart sounds.   Pulmonary:      Effort: Pulmonary effort is normal. No respiratory distress.      Breath sounds: Normal breath sounds.   Musculoskeletal:         General: No deformity or signs of injury. Normal range of motion.      Cervical back: Normal range of motion.      Comments: Gait normal    Skin:     General: Skin is warm and dry.      Findings: No rash.   Neurological:      General: No focal deficit present.      Mental Status: He is alert and oriented to person, place, and time. Mental status is at baseline.   Psychiatric:         Mood and Affect: Mood normal.         Behavior: Behavior normal.         Thought Content: Thought content normal.       Current Outpatient Medications on File Prior to Visit   Medication Sig Dispense Refill    albuterol (ACCUNEB) 1.25 mg/3 mL Nebu Take 3 mLs (1.25 mg total) by  nebulization every 6 (six) hours as needed (Wheezing). Rescue 75 mL 0    albuterol (VENTOLIN HFA) 90 mcg/actuation inhaler Inhale 2 puffs into the lungs every 4 (four) hours as needed for Wheezing. Rescue 18 g 5    ALBUTEROL, BULK, MISC       budesonide-formoterol 160-4.5 mcg (SYMBICORT) 160-4.5 mcg/actuation HFAA Inhale 2 puffs into the lungs every 12 (twelve) hours. Controller 10.2 g 3    buPROPion (WELLBUTRIN XL) 150 MG TB24 tablet Take 1 tablet (150 mg total) by mouth once daily. 30 tablet 11    dextroamphetamine-amphetamine (ADDERALL XR) 30 MG 24 hr capsule 30 mg every morning.      fluticasone propionate (FLONASE) 50 mcg/actuation nasal spray USE 1 SPRAY (50 MCG TOTAL) IN EACH NOSTRIL ONCE DAILY 48 mL 3    fluticasone-salmeterol diskus inhaler 250-50 mcg Inhale 1 puff into the lungs 2 (two) times daily.      montelukast (SINGULAIR) 10 mg tablet TAKE 1 TABLET BY MOUTH EVERY DAY IN THE EVENING 90 tablet 1    loratadine (CLARITIN) 10 mg tablet Take 1 tablet (10 mg total) by mouth once daily. 30 tablet 0    methylPREDNISolone (MEDROL DOSEPACK) 4 mg tablet Take as directed 21 tablet 0    promethazine-dextromethorphan (PROMETHAZINE-DM) 6.25-15 mg/5 mL Syrp Take 5 mLs by mouth every 6 (six) hours as needed (cough). (Patient not taking: Reported on 3/26/2024) 118 mL 0     No current facility-administered medications on file prior to visit.         Assessment:       1. Physical exam, annual    2. Irritable bowel syndrome, unspecified type    3. Moderate persistent asthmatic bronchitis without complication    4. Moderate episode of recurrent major depressive disorder    5. Generalized anxiety disorder with panic attacks        Plan:       Physical exam, annual  -     CBC Auto Differential; Future; Expected date: 03/26/2024  -     Comprehensive Metabolic Panel; Future; Expected date: 03/26/2024  -     Hemoglobin A1C; Future; Expected date: 03/26/2024  -     Lipid Panel; Future; Expected date: 03/26/2024  -     T4;  Future; Expected date: 03/26/2024  -     TSH; Future; Expected date: 03/26/2024  -     Vitamin D; Future; Expected date: 03/26/2024  -     Microalbumin/Creatinine Ratio, Urine; Future; Expected date: 03/26/2024   - annual screening labs ordered    Irritable bowel syndrome, unspecified type  -     Ambulatory referral/consult to Gastroenterology; Future; Expected date: 04/02/2024   - as I am unaware of what formal steps made to confirm this diagnosis (and rule out alternative differential) will facilitate establishment with gastroenterology and follow along; recommendations and interventions appreciated     Moderate persistent asthmatic bronchitis without complication   - will follow pulmonology    Moderate episode of recurrent major depressive disorder  Generalized anxiety disorder with panic attacks   - will defer further pharmacotherapy changes to a psychiatry; however, given current anxiety predominant concomitant anxiety/depression (especially in setting of active stimulant use) would presume in SSRI to be more appropriate than ongoing Wellbutrin.  This was expressed to the patient who will discuss it with his psychiatrist.    Other orders  -     (In Office Administered) Tdap Vaccine

## 2024-04-02 ENCOUNTER — LAB VISIT (OUTPATIENT)
Dept: LAB | Facility: HOSPITAL | Age: 39
End: 2024-04-02
Attending: STUDENT IN AN ORGANIZED HEALTH CARE EDUCATION/TRAINING PROGRAM
Payer: COMMERCIAL

## 2024-04-02 DIAGNOSIS — Z00.00 PHYSICAL EXAM, ANNUAL: ICD-10-CM

## 2024-04-02 LAB
ALBUMIN/CREAT UR: NORMAL UG/MG (ref 0–30)
CREAT UR-MCNC: 36 MG/DL (ref 23–375)
MICROALBUMIN UR DL<=1MG/L-MCNC: <5 UG/ML

## 2024-04-02 PROCEDURE — 82043 UR ALBUMIN QUANTITATIVE: CPT | Performed by: STUDENT IN AN ORGANIZED HEALTH CARE EDUCATION/TRAINING PROGRAM

## 2024-04-03 ENCOUNTER — PATIENT MESSAGE (OUTPATIENT)
Dept: UROLOGY | Facility: CLINIC | Age: 39
End: 2024-04-03
Payer: COMMERCIAL

## 2024-04-11 ENCOUNTER — PATIENT MESSAGE (OUTPATIENT)
Dept: PULMONOLOGY | Facility: CLINIC | Age: 39
End: 2024-04-11
Payer: COMMERCIAL

## 2024-04-11 DIAGNOSIS — J45.20 MILD INTERMITTENT ASTHMA WITHOUT COMPLICATION: Primary | ICD-10-CM

## 2024-04-11 RX ORDER — ALBUTEROL SULFATE 90 UG/1
2 AEROSOL, METERED RESPIRATORY (INHALATION) EVERY 4 HOURS PRN
Qty: 18 G | Refills: 5 | Status: SHIPPED | OUTPATIENT
Start: 2024-04-11

## 2024-04-11 RX ORDER — FLUTICASONE PROPIONATE 50 MCG
SPRAY, SUSPENSION (ML) NASAL
Qty: 18.2 ML | Refills: 11 | Status: SHIPPED | OUTPATIENT
Start: 2024-04-11

## 2024-04-11 NOTE — TELEPHONE ENCOUNTER
No care due was identified.  [facility] Embedded Care Due Messages. Reference number: [id number].   4/11/2024 12:00:10 PM CDT

## 2024-04-30 ENCOUNTER — HOSPITAL ENCOUNTER (OUTPATIENT)
Dept: PULMONOLOGY | Facility: CLINIC | Age: 39
Discharge: HOME | End: 2024-04-30
Payer: COMMERCIAL

## 2024-04-30 ENCOUNTER — PATIENT MESSAGE (OUTPATIENT)
Dept: UROLOGY | Facility: CLINIC | Age: 39
End: 2024-04-30
Payer: COMMERCIAL

## 2024-04-30 DIAGNOSIS — Z98.52 S/P VASECTOMY: Primary | ICD-10-CM

## 2024-04-30 DIAGNOSIS — J45.20 MILD INTERMITTENT ASTHMA WITHOUT COMPLICATION: ICD-10-CM

## 2024-04-30 LAB
FEF 25 75 LLN: 2.11
FEF 25 75 PRE REF: 126.6 %
FEF 25 75 REF: 3.62
FET100 CHG: 4.2 %
FEV05 LLN: 1.55
FEV05 REF: 2.68
FEV1 CHG: -1 %
FEV1 FVC LLN: 71
FEV1 FVC PRE REF: 106.1 %
FEV1 FVC REF: 82
FEV1 LLN: 2.84
FEV1 PRE REF: 96.7 %
FEV1 REF: 3.58
FEV1 VOL CHG: -0.03
FVC CHG: -1.6 %
FVC LLN: 3.49
FVC PRE REF: 91 %
FVC REF: 4.39
FVC VOL CHG: -0.06
PEF LLN: 7.09
PEF PRE REF: 103.5 %
PEF REF: 9.08
PHYSICIAN COMMENT: NORMAL
POST FEF 25 75: 4.55 L/S (ref 2.11–5.53)
POST FET 100: 6.24 SEC
POST FEV1 FVC: 87.16 % (ref 71.1–90.62)
POST FEV1: 3.43 L (ref 2.84–4.29)
POST FEV5: 2.87 L (ref 1.55–3.82)
POST FVC: 3.93 L (ref 3.49–5.29)
POST PEF: 10.36 L/S (ref 7.09–11.08)
PRE FEF 25 75: 4.58 L/S (ref 2.11–5.53)
PRE FET 100: 5.99 SEC
PRE FEV05 REF: 108.3 %
PRE FEV1 FVC: 86.66 % (ref 71.1–90.62)
PRE FEV1: 3.46 L (ref 2.84–4.29)
PRE FEV5: 2.91 L (ref 1.55–3.82)
PRE FVC: 3.99 L (ref 3.49–5.29)
PRE PEF: 9.4 L/S (ref 7.09–11.08)

## 2024-04-30 PROCEDURE — 94060 EVALUATION OF WHEEZING: CPT | Mod: S$GLB,,, | Performed by: INTERNAL MEDICINE

## 2024-04-30 NOTE — PROGRESS NOTES
Your breathing tests today look good with modest improvement as compared to prior testing 6 weeks ago.  Would like to check some blood work to look for possible triggers for your asthma.  If okay with you, I'll ask the clinic team to reach out to you to schedule.  Take care, DET

## 2024-05-07 ENCOUNTER — OFFICE VISIT (OUTPATIENT)
Dept: GASTROENTEROLOGY | Facility: CLINIC | Age: 39
End: 2024-05-07
Payer: COMMERCIAL

## 2024-05-07 ENCOUNTER — PATIENT MESSAGE (OUTPATIENT)
Dept: PULMONOLOGY | Facility: CLINIC | Age: 39
End: 2024-05-07
Payer: COMMERCIAL

## 2024-05-07 VITALS — WEIGHT: 176.81 LBS | HEIGHT: 65 IN | BODY MASS INDEX: 29.46 KG/M2

## 2024-05-07 DIAGNOSIS — R09.A2 GLOBUS SENSATION: ICD-10-CM

## 2024-05-07 DIAGNOSIS — R13.19 ESOPHAGEAL DYSPHAGIA: ICD-10-CM

## 2024-05-07 DIAGNOSIS — K21.9 GASTROESOPHAGEAL REFLUX DISEASE WITHOUT ESOPHAGITIS: Primary | ICD-10-CM

## 2024-05-07 DIAGNOSIS — K58.9 IRRITABLE BOWEL SYNDROME, UNSPECIFIED TYPE: ICD-10-CM

## 2024-05-07 PROCEDURE — 3044F HG A1C LEVEL LT 7.0%: CPT | Mod: CPTII,S$GLB,, | Performed by: INTERNAL MEDICINE

## 2024-05-07 PROCEDURE — 99204 OFFICE O/P NEW MOD 45 MIN: CPT | Mod: S$GLB,,, | Performed by: INTERNAL MEDICINE

## 2024-05-07 PROCEDURE — 99999 PR PBB SHADOW E&M-EST. PATIENT-LVL IV: CPT | Mod: PBBFAC,,, | Performed by: INTERNAL MEDICINE

## 2024-05-07 PROCEDURE — 1159F MED LIST DOCD IN RCRD: CPT | Mod: CPTII,S$GLB,, | Performed by: INTERNAL MEDICINE

## 2024-05-07 PROCEDURE — 3008F BODY MASS INDEX DOCD: CPT | Mod: CPTII,S$GLB,, | Performed by: INTERNAL MEDICINE

## 2024-05-07 RX ORDER — PANTOPRAZOLE SODIUM 40 MG/1
40 TABLET, DELAYED RELEASE ORAL DAILY
Qty: 30 TABLET | Refills: 6 | Status: SHIPPED | OUTPATIENT
Start: 2024-05-07 | End: 2025-05-07

## 2024-05-07 RX ORDER — DICYCLOMINE HYDROCHLORIDE 10 MG/1
10 CAPSULE ORAL
Qty: 60 CAPSULE | Refills: 1 | Status: SHIPPED | OUTPATIENT
Start: 2024-05-07

## 2024-05-07 NOTE — PATIENT INSTRUCTIONS
Pantoprazole (protonix) - acid medication, to be taken daily, first thing in AM     Dicyclomine (bentyl) - blue pill, as needed, for the irritable bowel, upset stomach

## 2024-05-07 NOTE — PROGRESS NOTES
GASTROENTEROLOGY CLINIC NOTE    Reason for visit: The primary encounter diagnosis was Gastroesophageal reflux disease without esophagitis. Diagnoses of Irritable bowel syndrome, unspecified type, Esophageal dysphagia, and Globus sensation were also pertinent to this visit.  Referring provider/PCP: Kamini Rizvi MD    HPI:  Amol North is a 39 y.o. male here today for new patient eval of numerous gi symptoms. New patient.  Reported hx of IBS    Ongoing , reflux, dysphagia, trouble swallowing. Sour taste, cough, gag, globus, regurgitation. Tried tums which didn't help. + hoarseness. Burning, worse postprandial, some odynophagia.   Ongoing cramping at times, knows foods to avoid (broccli) and anxiety is big trigger. Alleviating factor is avoiding sparkling water. Lots of bloating and gas, knows to manage stress to help this.  Recently completed steroid dose which helped gi symptoms.    Over 10 years ago, was having Right sided abd pains, was told might be appendix, at least 2 CT scans, and u/s ,and all that was normal, blood work  (no stool testing) and was ultimately told IBS and was related to stress.     Hx of bronchitis , treated with steroids.   Hx of asmtha.    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    Prior Endoscopy:  EGD: no  Colon: no    (Portions of this note were dictated using voice recognition software and may contain dictation related errors in spelling/grammar/syntax not found on text review)    Review of Systems   Constitutional:  Negative for fever and malaise/fatigue.   Respiratory:  Negative for cough and shortness of breath.    Cardiovascular:  Negative for chest pain and palpitations.   Gastrointestinal:  Positive for heartburn. Negative for abdominal pain, blood in stool, nausea and vomiting.   Neurological:  Negative for dizziness and headaches.       Past Medical History: has a past medical history of Anxiety, Asthma, Depression, and IBS (irritable bowel  syndrome).    Past Surgical History: has no past surgical history on file.    Family History:family history includes Anxiety disorder in his father and mother.    Allergies:   Review of patient's allergies indicates:   Allergen Reactions    Biaxin [clarithromycin] Palpitations       Social History: reports that he has never smoked. He has never used smokeless tobacco. He reports current alcohol use. He reports current drug use. Drug: Marijuana.    Home medications:   Current Outpatient Medications on File Prior to Visit   Medication Sig Dispense Refill    albuterol (ACCUNEB) 1.25 mg/3 mL Nebu Take 3 mLs (1.25 mg total) by nebulization every 6 (six) hours as needed (Wheezing). Rescue 75 mL 0    albuterol (VENTOLIN HFA) 90 mcg/actuation inhaler Inhale 2 puffs into the lungs every 4 (four) hours as needed for Wheezing. Rescue 18 g 5    ALBUTEROL, BULK, MISC       budesonide-formoterol 160-4.5 mcg (SYMBICORT) 160-4.5 mcg/actuation HFAA Inhale 2 puffs into the lungs every 12 (twelve) hours. Controller 10.2 g 3    dextroamphetamine-amphetamine (ADDERALL XR) 30 MG 24 hr capsule 30 mg every morning.      fluticasone propionate (FLONASE) 50 mcg/actuation nasal spray USE 1 SPRAY (50 MCG TOTAL) IN EACH NOSTRIL ONCE DAILY 18.2 mL 11    montelukast (SINGULAIR) 10 mg tablet TAKE 1 TABLET BY MOUTH EVERY DAY IN THE EVENING 90 tablet 1    buPROPion (WELLBUTRIN XL) 150 MG TB24 tablet Take 1 tablet (150 mg total) by mouth once daily. 30 tablet 11    fluticasone-salmeterol diskus inhaler 250-50 mcg Inhale 1 puff into the lungs 2 (two) times daily.      loratadine (CLARITIN) 10 mg tablet Take 1 tablet (10 mg total) by mouth once daily. 30 tablet 0    methylPREDNISolone (MEDROL DOSEPACK) 4 mg tablet Take as directed (Patient not taking: Reported on 5/7/2024) 21 tablet 0    promethazine-dextromethorphan (PROMETHAZINE-DM) 6.25-15 mg/5 mL Syrp Take 5 mLs by mouth every 6 (six) hours as needed (cough). (Patient not taking: Reported on  "5/7/2024) 118 mL 0     No current facility-administered medications on file prior to visit.       Vital signs:  Ht 5' 5" (1.651 m)   Wt 80.2 kg (176 lb 12.9 oz)   BMI 29.42 kg/m²     Physical Exam  Vitals reviewed.   Constitutional:       Appearance: He is not toxic-appearing.   HENT:      Head: Normocephalic and atraumatic.   Eyes:      General: No scleral icterus.     Conjunctiva/sclera: Conjunctivae normal.   Neurological:      Mental Status: He is alert and oriented to person, place, and time.      Gait: Gait normal.   Psychiatric:         Mood and Affect: Mood normal.         Behavior: Behavior normal.         I have reviewed prior labs, imaging, notes from last month    Assessment:  1. Gastroesophageal reflux disease without esophagitis    2. Irritable bowel syndrome, unspecified type    3. Esophageal dysphagia    4. Globus sensation      Upper gi symptoms, perhaps reflux, perhaps EOE induced.    Longstanding dx of IBS, with stress induced symptoms    Plan:  Orders Placed This Encounter    dicyclomine (BENTYL) 10 MG capsule    pantoprazole (PROTONIX) 40 MG tablet       Trial protonix 40 daily    Use bentyl prn     He will let us know how he is doing in next 2 months, consider EGD if symptoms not imrpved (with pan biopsies, eoe, g/d/ including disaccharidase)    RTC based on above.    Benjamin Alvarado MD  [facility]           "

## 2024-05-08 DIAGNOSIS — J45.20 MILD INTERMITTENT ASTHMA WITHOUT COMPLICATION: Primary | ICD-10-CM

## 2024-05-14 ENCOUNTER — LAB VISIT (OUTPATIENT)
Dept: LAB | Facility: HOSPITAL | Age: 39
End: 2024-05-14
Payer: COMMERCIAL

## 2024-05-14 ENCOUNTER — OFFICE VISIT (OUTPATIENT)
Dept: ALLERGY | Facility: CLINIC | Age: 39
End: 2024-05-14
Payer: COMMERCIAL

## 2024-05-14 VITALS — HEIGHT: 65 IN | BODY MASS INDEX: 29.42 KG/M2 | WEIGHT: 176.56 LBS

## 2024-05-14 DIAGNOSIS — J31.0 CHRONIC RHINITIS: ICD-10-CM

## 2024-05-14 DIAGNOSIS — L50.1 CHRONIC IDIOPATHIC URTICARIA: ICD-10-CM

## 2024-05-14 DIAGNOSIS — J45.20 MILD INTERMITTENT ASTHMA WITHOUT COMPLICATION: ICD-10-CM

## 2024-05-14 DIAGNOSIS — J31.0 CHRONIC RHINITIS: Primary | ICD-10-CM

## 2024-05-14 DIAGNOSIS — K58.9 IRRITABLE BOWEL SYNDROME, UNSPECIFIED TYPE: ICD-10-CM

## 2024-05-14 DIAGNOSIS — K21.9 GASTROESOPHAGEAL REFLUX DISEASE, UNSPECIFIED WHETHER ESOPHAGITIS PRESENT: ICD-10-CM

## 2024-05-14 PROCEDURE — 99999 PR PBB SHADOW E&M-EST. PATIENT-LVL III: CPT | Mod: PBBFAC,,, | Performed by: STUDENT IN AN ORGANIZED HEALTH CARE EDUCATION/TRAINING PROGRAM

## 2024-05-14 PROCEDURE — 99205 OFFICE O/P NEW HI 60 MIN: CPT | Mod: S$GLB,,, | Performed by: STUDENT IN AN ORGANIZED HEALTH CARE EDUCATION/TRAINING PROGRAM

## 2024-05-14 PROCEDURE — 36415 COLL VENOUS BLD VENIPUNCTURE: CPT | Performed by: STUDENT IN AN ORGANIZED HEALTH CARE EDUCATION/TRAINING PROGRAM

## 2024-05-14 PROCEDURE — 3044F HG A1C LEVEL LT 7.0%: CPT | Mod: CPTII,S$GLB,, | Performed by: STUDENT IN AN ORGANIZED HEALTH CARE EDUCATION/TRAINING PROGRAM

## 2024-05-14 PROCEDURE — 86003 ALLG SPEC IGE CRUDE XTRC EA: CPT | Performed by: STUDENT IN AN ORGANIZED HEALTH CARE EDUCATION/TRAINING PROGRAM

## 2024-05-14 PROCEDURE — 86003 ALLG SPEC IGE CRUDE XTRC EA: CPT | Mod: 59 | Performed by: STUDENT IN AN ORGANIZED HEALTH CARE EDUCATION/TRAINING PROGRAM

## 2024-05-14 PROCEDURE — 1159F MED LIST DOCD IN RCRD: CPT | Mod: CPTII,S$GLB,, | Performed by: STUDENT IN AN ORGANIZED HEALTH CARE EDUCATION/TRAINING PROGRAM

## 2024-05-14 PROCEDURE — 3008F BODY MASS INDEX DOCD: CPT | Mod: CPTII,S$GLB,, | Performed by: STUDENT IN AN ORGANIZED HEALTH CARE EDUCATION/TRAINING PROGRAM

## 2024-05-14 RX ORDER — CETIRIZINE HYDROCHLORIDE 10 MG/1
10 TABLET ORAL DAILY
COMMUNITY

## 2024-05-14 RX ORDER — AZELASTINE 1 MG/ML
1 SPRAY, METERED NASAL 2 TIMES DAILY
Qty: 30 ML | Refills: 11 | Status: SHIPPED | OUTPATIENT
Start: 2024-05-14 | End: 2025-05-14

## 2024-05-14 NOTE — PROGRESS NOTES
ALLERGY & IMMUNOLOGY CLINIC   HISTORY OF PRESENT ILLNESS   Referral from: Dr. Juan C Polo  CC:   Chief Complaint   Patient presents with    Allergies     Runny nose, itchy watery eyes     Asthma     HPI: Amolus North is a 39 y.o. 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)  Referred by pulm to evaluate for allergies  As long as takes cetirizine and flonase, is ok  Albuterol last used 3/2024  Symbicort 1 puff BID but having hoarseness so with pulm planning to maybe stop  GI also wondering about EoE, hasn't had EGD yet, started on pantoprazole 40mg daily  New onset hoarseness, gagging cough, sometimes regurgitates food  Right now with sour taste in back of throat  No dysphagia, no pain  Itchy watery eyes, runny nose  Since childhood  Anaphylaxis to prior SPT at [facility]  3 cats does well with  Had dairy and wheat allergy as a child found by food elimination diet, one caused asthma to flare, the other caused upset stomach  Gets URIs about once a year associated with bronchitis, otherwise no other infections  No pneumonia  Used to get hives when around mold or when uses scented detergent  Now eating wheat, dairy infrequently but ok last had 20 years ago    Drug Allergies:   Review of patient's allergies indicates:   Allergen Reactions    Biaxin [clarithromycin] Palpitations         MEDICAL HISTORY   MedHx:   Patient Active Problem List   Diagnosis    Moderate episode of recurrent major depressive disorder    Generalized anxiety disorder with panic attacks    Asthma       SurgHx:  History reviewed. No pertinent surgical history.    Medications:   Current Outpatient Medications on File Prior to Visit   Medication Sig Dispense Refill    budesonide-formoterol 160-4.5 mcg (SYMBICORT) 160-4.5 mcg/actuation HFAA Inhale 2 puffs into the lungs every 12 (twelve) hours. Controller 10.2 g 3    cetirizine (ZYRTEC) 10 MG tablet Take 10 mg by mouth once daily.      dextroamphetamine-amphetamine (ADDERALL XR) 30 MG 24  "hr capsule 30 mg every morning.      dicyclomine (BENTYL) 10 MG capsule Take 1 capsule (10 mg total) by mouth before meals as needed. 60 capsule 1    fluticasone propionate (FLONASE) 50 mcg/actuation nasal spray USE 1 SPRAY (50 MCG TOTAL) IN EACH NOSTRIL ONCE DAILY 18.2 mL 11    montelukast (SINGULAIR) 10 mg tablet TAKE 1 TABLET BY MOUTH EVERY DAY IN THE EVENING 90 tablet 1    pantoprazole (PROTONIX) 40 MG tablet Take 1 tablet (40 mg total) by mouth once daily. 30 tablet 6    albuterol (ACCUNEB) 1.25 mg/3 mL Nebu Take 3 mLs (1.25 mg total) by nebulization every 6 (six) hours as needed (Wheezing). Rescue (Patient not taking: Reported on 5/14/2024) 75 mL 0    albuterol (VENTOLIN HFA) 90 mcg/actuation inhaler Inhale 2 puffs into the lungs every 4 (four) hours as needed for Wheezing. Rescue (Patient not taking: Reported on 5/14/2024) 18 g 5    buPROPion (WELLBUTRIN XL) 150 MG TB24 tablet Take 1 tablet (150 mg total) by mouth once daily. 30 tablet 11    promethazine-dextromethorphan (PROMETHAZINE-DM) 6.25-15 mg/5 mL Syrp Take 5 mLs by mouth every 6 (six) hours as needed (cough). (Patient not taking: Reported on 5/7/2024) 118 mL 0    [DISCONTINUED] ALBUTEROL, BULK, MISC       [DISCONTINUED] fluticasone-salmeterol diskus inhaler 250-50 mcg Inhale 1 puff into the lungs 2 (two) times daily.      [DISCONTINUED] loratadine (CLARITIN) 10 mg tablet Take 1 tablet (10 mg total) by mouth once daily. 30 tablet 0    [DISCONTINUED] methylPREDNISolone (MEDROL DOSEPACK) 4 mg tablet Take as directed (Patient not taking: Reported on 5/7/2024) 21 tablet 0     No current facility-administered medications on file prior to visit.      PHYSICAL EXAM   VS: Ht 5' 5" (1.651 m)   Wt 80.1 kg (176 lb 9.4 oz)   BMI 29.39 kg/m²   GENERAL: NAD, well nourished, well appearing  EYES: no conjunctival injection, no discharge, no infraorbital shiners  EARS: external auditory canals normal B/L  NOSE: NT pink 1+ B/L, no polyps  ORAL: MMM, no ulcers, no " "thrush, no cobblestoning  LUNGS: CTAB, no w/r/c, no increased WOB  DERM: no rashes   ALLERGEN TESTING   Immunocaps: ordered     ASSESSMENT & PLAN     Amol North is a 39 y.o. male with     Chronic rhinitis: s/p AIT childhood +ragweed, mold; has 3 cats no issue  - Amb alg panel ordered  - Azelastine 1-2 squirts each nostril twice a day  - Has Flonase 1-2 squirts each nostril twice a day  - Nose spray technique reviewed (aim out not towards septum, can breathe out of mouth when spraying, can gently rock head or blow nose after a few seconds to help with excess liquid - "if you taste it you waste it" as goal is to get it into nasal mucosa)    Asthma  - Well controlled, currently on Symbicort 160 and thinking of changing to SMART therapy  - Singulair, trial off  - Established with pulm    GERD  - Can contribute to stubborn post nasal drip via protective reflux/reflex mechanism  - Saline rinse with distilled water daily as needed to flush thick mucus out of sinuses  - Guiafenesin (mucinex) 600mg ER twice a day with large glass of water as needed as mucolytic to thin out secretions  - Established with GI, question of EOE and possible upcoming EGD pending results of PPI trial    Irritable bowel syndrome (IBS): neurotransmitter/nerve issue  You can try the following diets and see if any of them help, with various degrees of success:  - FODMAPS  - Lactose free  - Increase psyllium (improves both constipation and diarrhea)  - Increase physical activity    Chronic spontaneous (idiopathic) urticaria: resolved  - These are autoimmune in nature but benign  - These are due to mast cells, which our own immune system can accidentally trigger  - Mast cells are in our skin and mucus membranes (lips)  - They can come and go, but typically do fizzle out   - Treatment is symptomatic, when you have hives you can use up to 2 tabs twice a day (4 a day total) of nonsedating antihistamines  - Antihistamine choices include Zyrtec " (cetirizine), Xyzal (levocetirizine), Claritin (loratadine), Clarinex (desloratadine), and Allegra (fexofenadine)  - We typically avoid Benadryl (diphenhydramine) as this is sedating and there is an increased risk of dementia associated with high dose frequent use  - Xolair monthly injections are another option if hives persist    Follow-up: PRN    I spent a total of 60 minutes on the day of the visit. This includes face to face time and non-face to face time preparing to see the patient (eg, review of tests), obtaining and/or reviewing separately obtained history, documenting clinical information in the electronic or other health record, independently interpreting results and communicating results to the patient/family/caregiver, or care coordinator.

## 2024-05-16 LAB
A ALTERNATA IGE QN: 0.5 KU/L
A FUMIGATUS IGE QN: <0.1 KU/L
BERMUDA GRASS IGE QN: <0.1 KU/L
CAT DANDER IGE QN: <0.1 KU/L
CEDAR IGE QN: <0.1 KU/L
D FARINAE IGE QN: <0.1 KU/L
D PTERONYSS IGE QN: <0.1 KU/L
DEPRECATED A ALTERNATA IGE RAST QL: ABNORMAL
DEPRECATED A FUMIGATUS IGE RAST QL: NORMAL
DEPRECATED BERMUDA GRASS IGE RAST QL: NORMAL
DEPRECATED CAT DANDER IGE RAST QL: NORMAL
DEPRECATED CEDAR IGE RAST QL: NORMAL
DEPRECATED D FARINAE IGE RAST QL: NORMAL
DEPRECATED D PTERONYSS IGE RAST QL: NORMAL
DEPRECATED DOG DANDER IGE RAST QL: NORMAL
DEPRECATED ELDER IGE RAST QL: ABNORMAL
DEPRECATED ENGL PLANTAIN IGE RAST QL: NORMAL
DEPRECATED PECAN/HICK TREE IGE RAST QL: NORMAL
DEPRECATED ROACH IGE RAST QL: NORMAL
DEPRECATED TIMOTHY IGE RAST QL: NORMAL
DEPRECATED WEST RAGWEED IGE RAST QL: ABNORMAL
DEPRECATED WHITE OAK IGE RAST QL: NORMAL
DOG DANDER IGE QN: <0.1 KU/L
ELDER IGE QN: 0.12 KU/L
ENGL PLANTAIN IGE QN: <0.1 KU/L
PECAN/HICK TREE IGE QN: <0.1 KU/L
ROACH IGE QN: <0.1 KU/L
TIMOTHY IGE QN: <0.1 KU/L
WEST RAGWEED IGE QN: 0.19 KU/L
WHITE OAK IGE QN: <0.1 KU/L

## 2024-11-16 DIAGNOSIS — R13.19 ESOPHAGEAL DYSPHAGIA: ICD-10-CM

## 2024-11-16 DIAGNOSIS — K21.9 GASTROESOPHAGEAL REFLUX DISEASE WITHOUT ESOPHAGITIS: ICD-10-CM

## 2024-11-18 RX ORDER — PANTOPRAZOLE SODIUM 40 MG/1
40 TABLET, DELAYED RELEASE ORAL
Qty: 30 TABLET | Refills: 11 | Status: SHIPPED | OUTPATIENT
Start: 2024-11-18

## 2025-01-28 ENCOUNTER — OFFICE VISIT (OUTPATIENT)
Dept: URGENT CARE | Facility: CLINIC | Age: 40
End: 2025-01-28
Payer: COMMERCIAL

## 2025-01-28 VITALS
SYSTOLIC BLOOD PRESSURE: 126 MMHG | OXYGEN SATURATION: 98 % | RESPIRATION RATE: 20 BRPM | TEMPERATURE: 98 F | HEIGHT: 65 IN | DIASTOLIC BLOOD PRESSURE: 86 MMHG | BODY MASS INDEX: 29.99 KG/M2 | WEIGHT: 180 LBS | HEART RATE: 90 BPM
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
## 2025-01-28 PROCEDURE — 99213 OFFICE O/P EST LOW 20 MIN: CPT | Mod: S$GLB,,, | Performed by: NURSE PRACTITIONER

## 2025-01-28 RX ORDER — METRONIDAZOLE 500 MG/1
500 TABLET ORAL EVERY 8 HOURS
Qty: 21 TABLET | Refills: 0 | Status: SHIPPED | OUTPATIENT
Start: 2025-01-28 | End: 2025-02-04

## 2025-01-28 RX ORDER — MUPIROCIN 20 MG/G
OINTMENT TOPICAL
Qty: 22 G | Refills: 1 | Status: SHIPPED | OUTPATIENT
Start: 2025-01-28 | End: 2025-02-04

## 2025-01-28 RX ORDER — DOXYCYCLINE 100 MG/1
100 CAPSULE ORAL 2 TIMES DAILY
Qty: 14 CAPSULE | Refills: 0 | Status: SHIPPED | OUTPATIENT
Start: 2025-01-28 | End: 2025-02-04

## 2025-01-28 NOTE — PROGRESS NOTES
"Subjective:      Patient ID: Amol North is a 39 y.o. male.    Vitals:  height is 5' 5" (1.651 m) and weight is 81.6 kg (180 lb). His oral temperature is 98.4 °F (36.9 °C). His blood pressure is 126/86 and his pulse is 90. His respiration is 20 and oxygen saturation is 98%.     Chief Complaint: Animal Bite    Patient presents with cat bite from a community cat in left hand, redness and swelling. Onset 9am.  Provider note begins below    Review of chart shows patient had a Tdap last year.  No concern for rabies in the Cat.  Patient does have intolerance to Augmentin.  The Cat will be going to the  today.    Animal Bite   There is an injury to the Left hand. His tetanus status is unknown.       Skin:  Positive for wound, abrasion and puncture wound.      Objective:     Physical Exam   Constitutional: He is oriented to person, place, and time.   HENT:   Head: Normocephalic and atraumatic.   Cardiovascular: Normal rate.   Pulmonary/Chest: Effort normal. No respiratory distress.   Abdominal: Normal appearance.   Neurological: He is alert and oriented to person, place, and time.   Skin: Skin is dry.        Psychiatric: His behavior is normal. Mood normal.           Assessment:     1. Cat bite, initial encounter        Plan:   Wound cleaned with Hibiclens and saline  Bactroban   Augmentin would be 1st line however patient had severe stomach upset when he takes Augmentin  Will send in doxy and Flagyl   Follow up if you are developing signs of infection such as erythema or purulent discharge          Cat bite, initial encounter  -     doxycycline (MONODOX) 100 MG capsule; Take 1 capsule (100 mg total) by mouth 2 (two) times daily. for 7 days  Dispense: 14 capsule; Refill: 0  -     metroNIDAZOLE (FLAGYL) 500 MG tablet; Take 1 tablet (500 mg total) by mouth every 8 (eight) hours. for 7 days  Dispense: 21 tablet; Refill: 0  -     mupirocin (BACTROBAN) 2 % ointment; Apply to affected area 3 times daily  " Dispense: 22 g; Refill: 1

## 2025-04-14 NOTE — PROGRESS NOTES
Subjective:    The following note was written in combination with deep-scribe software and dictation (to which understanding and consent was verbally expressed); please excuse any transcription and/or dictation errors.      Chief Complaint: Annual Exam    HPI  Mr. Amol North is a 40-year-old man with anxiety/depression/ADHD ( Wellbutrin, generic Adderall, and cognitive behavioral therapy) and low-grade hyperlipidemia presenting for annual physical:      Family, social, surgical Hx reviewed     Health Maintenance:  Due for annual screening labs    Past Medical History:   Diagnosis Date    Allergy     Anxiety     Asthma     Depression     IBS (irritable bowel syndrome)      No past surgical history on file.  Family History   Problem Relation Name Age of Onset    Anxiety disorder Mother      Anxiety disorder Father       Social History[1]  Review of patient's allergies indicates:   Allergen Reactions    Biaxin [clarithromycin] Palpitations     Amol North had no medications administered during this visit.   Review of Systems   Constitutional:  Negative for activity change and unexpected weight change.   HENT:  Negative for hearing loss, rhinorrhea and trouble swallowing.    Eyes:  Negative for discharge and visual disturbance.   Respiratory:  Negative for chest tightness and wheezing.    Cardiovascular:  Negative for chest pain and palpitations.   Gastrointestinal:  Negative for blood in stool, constipation, diarrhea and vomiting.   Endocrine: Negative for polydipsia and polyuria.   Genitourinary:  Negative for difficulty urinating, hematuria and urgency.   Musculoskeletal:  Negative for arthralgias, joint swelling and neck pain.   Neurological:  Negative for weakness and headaches.   Psychiatric/Behavioral:  Negative for confusion and dysphoric mood.          Objective:      Vitals:    04/15/25 1029   BP: 122/88   Pulse: 101   Resp: 16   Temp: 97.8 °F (36.6 °C)      Physical Exam  Vitals reviewed.    Constitutional:       General: He is not in acute distress.     Appearance: Normal appearance.   HENT:      Head: Normocephalic and atraumatic.      Comments: Facial features are symmetric      Nose: Nose normal. No congestion or rhinorrhea.      Mouth/Throat:      Mouth: Mucous membranes are moist.      Pharynx: Oropharynx is clear. No oropharyngeal exudate or posterior oropharyngeal erythema.   Eyes:      General: No scleral icterus.     Extraocular Movements: Extraocular movements intact.      Conjunctiva/sclera: Conjunctivae normal.   Cardiovascular:      Rate and Rhythm: Normal rate and regular rhythm.      Pulses: Normal pulses.      Heart sounds: Normal heart sounds.   Pulmonary:      Effort: Pulmonary effort is normal. No respiratory distress.      Breath sounds: Normal breath sounds.   Musculoskeletal:         General: No deformity or signs of injury. Normal range of motion.      Cervical back: Normal range of motion.   Skin:     General: Skin is warm and dry.      Findings: No rash.   Neurological:      General: No focal deficit present.      Mental Status: He is alert and oriented to person, place, and time. Mental status is at baseline.   Psychiatric:         Mood and Affect: Mood normal.         Behavior: Behavior normal.         Thought Content: Thought content normal.       Medications Ordered Prior to Encounter[2]      Assessment:       1. Physical exam, annual        Plan:       Physical exam, annual  -     CBC Auto Differential; Future; Expected date: 04/15/2025  -     Comprehensive Metabolic Panel; Future; Expected date: 04/15/2025  -     Hemoglobin A1C; Future; Expected date: 04/15/2025  -     Lipid Panel; Future; Expected date: 04/15/2025  -     Vitamin D; Future; Expected date: 04/15/2025  -     TSH; Future; Expected date: 04/15/2025  -     Semen Analysis; Future; Expected date: 04/15/2025  -     pneumoc 20-angelita conj-dip cr(PF) (PREVNAR-20 (PF)) injection Syrg 0.5 mL   - annual screening labs  ordered   - did not complete follow-up post vasectomy and would like to ensure ejaculate is sterile; semen analysis ordered    - reporting occasional ED; trial of generic sildenafil called in    Other orders  -     Discontinue: sildenafiL (VIAGRA) 50 MG tablet; Take 1 tablet (50 mg total) by mouth daily as needed for Erectile Dysfunction.  Dispense: 30 tablet; Refill: 3  -     Discontinue: sildenafiL (VIAGRA) 50 MG tablet; Take 1 tablet (50 mg total) by mouth daily as needed for Erectile Dysfunction.  Dispense: 30 tablet; Refill: 3  -     sildenafiL (VIAGRA) 50 MG tablet; Take 1 tablet (50 mg total) by mouth daily as needed for Erectile Dysfunction.  Dispense: 30 tablet; Refill: 3                  [1]   Social History  Socioeconomic History    Marital status: Significant Other   Occupational History    Occupation: PHD student    Tobacco Use    Smoking status: Some Days     Types: Cigars     Passive exposure: Never    Smokeless tobacco: Never    Tobacco comments:     Cigars-social smoker   Substance and Sexual Activity    Alcohol use: Yes     Comment: occ;     Drug use: Yes     Types: Marijuana     Comment: none currently     Sexual activity: Yes     Partners: Female     Social Drivers of Health     Financial Resource Strain: Low Risk  (3/12/2024)    Overall Financial Resource Strain (CARDIA)     Difficulty of Paying Living Expenses: Not very hard   Food Insecurity: No Food Insecurity (3/12/2024)    Hunger Vital Sign     Worried About Running Out of Food in the Last Year: Never true     Ran Out of Food in the Last Year: Never true   Transportation Needs: Unmet Transportation Needs (3/12/2024)    PRAPARE - Transportation     Lack of Transportation (Medical): Yes     Lack of Transportation (Non-Medical): No   Physical Activity: Insufficiently Active (3/12/2024)    Exercise Vital Sign     Days of Exercise per Week: 2 days     Minutes of Exercise per Session: 20 min   Stress: Stress Concern Present (3/12/2024)    [employer]  [place] [employer] - Occupational Stress Questionnaire     Feeling of Stress : To some extent   Housing Stability: Low Risk  (3/12/2024)    Housing Stability Vital Sign     Unable to Pay for Housing in the Last Year: No     Number of Places Lived in the Last Year: 1     Unstable Housing in the Last Year: No   [2]   Current Outpatient Medications on File Prior to Visit   Medication Sig Dispense Refill    albuterol (VENTOLIN HFA) 90 mcg/actuation inhaler Inhale 2 puffs into the lungs every 4 (four) hours as needed for Wheezing. Rescue 18 g 5    buPROPion (WELLBUTRIN XL) 150 MG TB24 tablet Take 1 tablet (150 mg total) by mouth once daily. 30 tablet 11    cetirizine (ZYRTEC) 10 MG tablet Take 10 mg by mouth once daily.      dextroamphetamine-amphetamine (ADDERALL XR) 30 MG 24 hr capsule 30 mg every morning.      fluticasone propionate (FLONASE) 50 mcg/actuation nasal spray USE 1 SPRAY (50 MCG TOTAL) IN EACH NOSTRIL ONCE DAILY 18.2 mL 11    pantoprazole (PROTONIX) 40 MG tablet TAKE ONE TABLET BY MOUTH ONE TIME DAILY 30 tablet 11     No current facility-administered medications on file prior to visit.

## 2025-04-15 ENCOUNTER — LAB VISIT (OUTPATIENT)
Dept: LAB | Facility: HOSPITAL | Age: 40
End: 2025-04-15
Attending: STUDENT IN AN ORGANIZED HEALTH CARE EDUCATION/TRAINING PROGRAM
Payer: COMMERCIAL

## 2025-04-15 ENCOUNTER — OFFICE VISIT (OUTPATIENT)
Dept: INTERNAL MEDICINE | Facility: CLINIC | Age: 40
End: 2025-04-15
Payer: COMMERCIAL

## 2025-04-15 VITALS
BODY MASS INDEX: 30.78 KG/M2 | TEMPERATURE: 98 F | HEIGHT: 65 IN | DIASTOLIC BLOOD PRESSURE: 88 MMHG | SYSTOLIC BLOOD PRESSURE: 122 MMHG | WEIGHT: 184.75 LBS | RESPIRATION RATE: 16 BRPM | HEART RATE: 101 BPM | OXYGEN SATURATION: 98 %

## 2025-04-15 DIAGNOSIS — Z00.00 PHYSICAL EXAM, ANNUAL: ICD-10-CM

## 2025-04-15 DIAGNOSIS — Z00.00 PHYSICAL EXAM, ANNUAL: Primary | ICD-10-CM

## 2025-04-15 LAB
25(OH)D3+25(OH)D2 SERPL-MCNC: 42 NG/ML (ref 30–96)
ABSOLUTE EOSINOPHIL (OHS): 0.25 K/UL
ABSOLUTE MONOCYTE (OHS): 0.69 K/UL (ref 0.3–1)
ABSOLUTE NEUTROPHIL COUNT (OHS): 4.14 K/UL (ref 1.8–7.7)
ALBUMIN SERPL BCP-MCNC: 4.3 G/DL (ref 3.5–5.2)
ALP SERPL-CCNC: 70 UNIT/L (ref 40–150)
ALT SERPL W/O P-5'-P-CCNC: 57 UNIT/L (ref 10–44)
ANION GAP (OHS): 7 MMOL/L (ref 8–16)
AST SERPL-CCNC: 35 UNIT/L (ref 11–45)
BASOPHILS # BLD AUTO: 0.03 K/UL
BASOPHILS NFR BLD AUTO: 0.5 %
BILIRUB SERPL-MCNC: 0.7 MG/DL (ref 0.1–1)
BUN SERPL-MCNC: 9 MG/DL (ref 6–20)
CALCIUM SERPL-MCNC: 10 MG/DL (ref 8.7–10.5)
CHLORIDE SERPL-SCNC: 99 MMOL/L (ref 95–110)
CHOLEST SERPL-MCNC: 267 MG/DL (ref 120–199)
CHOLEST/HDLC SERPL: 4.5 {RATIO} (ref 2–5)
CO2 SERPL-SCNC: 29 MMOL/L (ref 23–29)
CREAT SERPL-MCNC: 1.1 MG/DL (ref 0.5–1.4)
EAG (OHS): 103 MG/DL (ref 68–131)
ERYTHROCYTE [DISTWIDTH] IN BLOOD BY AUTOMATED COUNT: 12 % (ref 11.5–14.5)
GFR SERPLBLD CREATININE-BSD FMLA CKD-EPI: >60 ML/MIN/1.73/M2
GLUCOSE SERPL-MCNC: 95 MG/DL (ref 70–110)
HBA1C MFR BLD: 5.2 % (ref 4–5.6)
HCT VFR BLD AUTO: 50.3 % (ref 40–54)
HDLC SERPL-MCNC: 59 MG/DL (ref 40–75)
HDLC SERPL: 22.1 % (ref 20–50)
HGB BLD-MCNC: 16 GM/DL (ref 14–18)
IMM GRANULOCYTES # BLD AUTO: 0.02 K/UL (ref 0–0.04)
IMM GRANULOCYTES NFR BLD AUTO: 0.3 % (ref 0–0.5)
LDLC SERPL CALC-MCNC: 182.2 MG/DL (ref 63–159)
LYMPHOCYTES # BLD AUTO: 1.53 K/UL (ref 1–4.8)
MCH RBC QN AUTO: 28.6 PG (ref 27–31)
MCHC RBC AUTO-ENTMCNC: 31.8 G/DL (ref 32–36)
MCV RBC AUTO: 90 FL (ref 82–98)
NONHDLC SERPL-MCNC: 208 MG/DL
NUCLEATED RBC (/100WBC) (OHS): 0 /100 WBC
PLATELET # BLD AUTO: 221 K/UL (ref 150–450)
PMV BLD AUTO: 11.1 FL (ref 9.2–12.9)
POTASSIUM SERPL-SCNC: 4.9 MMOL/L (ref 3.5–5.1)
PROT SERPL-MCNC: 7.9 GM/DL (ref 6–8.4)
RBC # BLD AUTO: 5.59 M/UL (ref 4.6–6.2)
RELATIVE EOSINOPHIL (OHS): 3.8 %
RELATIVE LYMPHOCYTE (OHS): 23 % (ref 18–48)
RELATIVE MONOCYTE (OHS): 10.4 % (ref 4–15)
RELATIVE NEUTROPHIL (OHS): 62 % (ref 38–73)
SODIUM SERPL-SCNC: 135 MMOL/L (ref 136–145)
TRIGL SERPL-MCNC: 129 MG/DL (ref 30–150)
TSH SERPL-ACNC: 1.64 UIU/ML (ref 0.4–4)
WBC # BLD AUTO: 6.66 K/UL (ref 3.9–12.7)

## 2025-04-15 PROCEDURE — 99999 PR PBB SHADOW E&M-EST. PATIENT-LVL III: CPT | Mod: PBBFAC,,, | Performed by: STUDENT IN AN ORGANIZED HEALTH CARE EDUCATION/TRAINING PROGRAM

## 2025-04-15 PROCEDURE — 80061 LIPID PANEL: CPT

## 2025-04-15 PROCEDURE — 3074F SYST BP LT 130 MM HG: CPT | Mod: CPTII,S$GLB,, | Performed by: STUDENT IN AN ORGANIZED HEALTH CARE EDUCATION/TRAINING PROGRAM

## 2025-04-15 PROCEDURE — 83036 HEMOGLOBIN GLYCOSYLATED A1C: CPT

## 2025-04-15 PROCEDURE — 3008F BODY MASS INDEX DOCD: CPT | Mod: CPTII,S$GLB,, | Performed by: STUDENT IN AN ORGANIZED HEALTH CARE EDUCATION/TRAINING PROGRAM

## 2025-04-15 PROCEDURE — 84443 ASSAY THYROID STIM HORMONE: CPT

## 2025-04-15 PROCEDURE — 99214 OFFICE O/P EST MOD 30 MIN: CPT | Mod: 25,S$GLB,, | Performed by: STUDENT IN AN ORGANIZED HEALTH CARE EDUCATION/TRAINING PROGRAM

## 2025-04-15 PROCEDURE — 90471 IMMUNIZATION ADMIN: CPT | Mod: S$GLB,,, | Performed by: STUDENT IN AN ORGANIZED HEALTH CARE EDUCATION/TRAINING PROGRAM

## 2025-04-15 PROCEDURE — 85025 COMPLETE CBC W/AUTO DIFF WBC: CPT

## 2025-04-15 PROCEDURE — 90677 PCV20 VACCINE IM: CPT | Mod: S$GLB,,, | Performed by: STUDENT IN AN ORGANIZED HEALTH CARE EDUCATION/TRAINING PROGRAM

## 2025-04-15 PROCEDURE — 36415 COLL VENOUS BLD VENIPUNCTURE: CPT | Mod: PO

## 2025-04-15 PROCEDURE — 1159F MED LIST DOCD IN RCRD: CPT | Mod: CPTII,S$GLB,, | Performed by: STUDENT IN AN ORGANIZED HEALTH CARE EDUCATION/TRAINING PROGRAM

## 2025-04-15 PROCEDURE — 82306 VITAMIN D 25 HYDROXY: CPT

## 2025-04-15 PROCEDURE — 99396 PREV VISIT EST AGE 40-64: CPT | Mod: 25,S$GLB,, | Performed by: STUDENT IN AN ORGANIZED HEALTH CARE EDUCATION/TRAINING PROGRAM

## 2025-04-15 PROCEDURE — 3079F DIAST BP 80-89 MM HG: CPT | Mod: CPTII,S$GLB,, | Performed by: STUDENT IN AN ORGANIZED HEALTH CARE EDUCATION/TRAINING PROGRAM

## 2025-04-15 PROCEDURE — 82040 ASSAY OF SERUM ALBUMIN: CPT

## 2025-04-15 RX ORDER — SILDENAFIL 50 MG/1
50 TABLET, FILM COATED ORAL DAILY PRN
Qty: 30 TABLET | Refills: 3 | Status: SHIPPED | OUTPATIENT
Start: 2025-04-15 | End: 2025-04-15

## 2025-04-15 RX ORDER — SILDENAFIL 50 MG/1
50 TABLET, FILM COATED ORAL DAILY PRN
Qty: 30 TABLET | Refills: 3 | Status: SHIPPED | OUTPATIENT
Start: 2025-04-15 | End: 2026-04-15

## 2025-04-16 ENCOUNTER — RESULTS FOLLOW-UP (OUTPATIENT)
Dept: INTERNAL MEDICINE | Facility: CLINIC | Age: 40
End: 2025-04-16